# Patient Record
Sex: FEMALE | Race: WHITE | Employment: PART TIME | URBAN - METROPOLITAN AREA
[De-identification: names, ages, dates, MRNs, and addresses within clinical notes are randomized per-mention and may not be internally consistent; named-entity substitution may affect disease eponyms.]

---

## 2018-12-03 ENCOUNTER — APPOINTMENT (OUTPATIENT)
Dept: PHYSICAL THERAPY | Age: 48
End: 2018-12-03

## 2018-12-11 ENCOUNTER — HOSPITAL ENCOUNTER (OUTPATIENT)
Dept: PHYSICAL THERAPY | Age: 48
Discharge: HOME | End: 2018-12-11
Payer: COMMERCIAL

## 2018-12-11 PROCEDURE — 97162 PT EVAL MOD COMPLEX 30 MIN: CPT

## 2018-12-11 PROCEDURE — 97110 THERAPEUTIC EXERCISES: CPT

## 2018-12-11 PROCEDURE — 97140 MANUAL THERAPY 1/> REGIONS: CPT

## 2018-12-11 NOTE — PROGRESS NOTES
Leandra Hale 29  [facility] CLINIC [facility]  [facility] 
[facility], [address] [place] [address], [address]  Phone: [phone]  Fax: [phone] PLAN OF CARE / STATEMENT OF MEDICAL NECESSITY FOR PHYSICAL THERAPY SERVICES Patient Name: Nancy Banks : 1970 Medical  
Diagnosis: Right knee pain [M25.561] Right ankle pain [M25.571] Treatment Diagnosis: Right knee pain [M25.561] Onset Date: 1 month Referral Source: Cherry Bryant MD [facility]): 2018 Prior Hospitalization: See medical history Provider #: [id number] Prior Level of Function: No limitations to activities Comorbidities: Concurrent R ankle pain Medications: Verified on Patient Summary List  
The Plan of Care and following information is based on the information from the initial evaluation.  
=========================================================================================== Assessment / key information:  Pt is a 46y.o. year old female with subjective complaints of insidious onset of R knee pain. Pt is limited with ability to squat, do stairs, and walk dog. Pt denies red flags. Notes increased swelling to anterior, inferior R knee. Current pain is rated as 1 to 4/10; localized to cong/post-lateral knee. FOTO score= 51/100 indicating 49% impairment to functional activities. Today's evaulation is significant for  
1) postural impairments: Patellar malalignment: Right: Grade 2 lateral patellar tilt, 4 cm medial glide. Left: grade 3 lateral patellar tilt . 2) Impaired knee A/PROM: R 0 to 110/120* 3) Impaired strength:  Hip flex R 4-/5, L 4/5; Knee Ext R 4+*/5, L 4+/5; flex R 4*/5, L 5/5. Poor b/l VMO recruitment in QS. 4) Additional findings: R knee edema 2 cm. TTP to distal biceps femoris, lateral patellar retinaculum. Squat limited to 45 deg due to pain with dynamic genu valgus. (-) ligamentous tests. These findings are supportive for diagnosis of R knee patello-femoral syndrome. Pt will be a good candidate for skilled PT to address these impairments and promote return to normal ADLs and functional mobility for improved quality of life. 
=========================================================================================== Eval Complexity: History MEDIUM  Complexity : 1-2 comorbidities / personal factors will impact the outcome/ POC ;  Examination  MEDIUM Complexity : 3 Standardized tests and measures addressing body structure, function, activity limitation and / or participation in recreation ; Presentation MEDIUM Complexity : Evolving with changing characteristics ; Decision Making MEDIUM Complexity : FOTO score of 26-74; Overall Complexity MEDIUM Problem List: pain affecting function, decrease ROM, decrease strength, edema affecting function, impaired gait/ balance, decrease ADL/ functional abilitiies, decrease activity tolerance and decrease flexibility/ joint mobility Treatment Plan may include any combination of the following: Therapeutic exercise, Therapeutic activities, Neuromuscular re-education, Physical agent/modality, Gait/balance training, Manual therapy, Patient education and Functional mobility training Patient / Family readiness to learn indicated by: asking questions, trying to perform skills and interest 
Persons(s) to be included in education: patient (P) Barriers to Learning/Limitations: None Measures taken:   
Patient Goal (s): \"less pain increased mobility\" Patient self reported health status: excellent Rehabilitation Potential: good ? Short Term Goals: To be accomplished in  2  weeks: 1. Pt will be educated in appropriate HEP to decrease pain, increase ROM, increase strength and return pt to PLOF. 2. Pt will demonstrate good patellar tracking with QS. 3.  Pt will increase R knee flexion by >/= 10 degrees to promote decreased pain with bending knee. ? Long Term Goals: To be accomplished in  6  weeks: 1. Pt will improve FOTO score to >/= 69 to demo a significant improvement in functional activity tolerance. 2. Pt will demonstrate squat to 90 degrees with good mechanics, pain free to decrease impairment caring for dog. 3. Pt will ambulate reciprocally up/down flight of stairs without increased knee pain with good mechanics. Frequency / Duration:   Patient to be seen  2  times per week for 4-6  weeks: 
Patient / Caregiver education and instruction: activity modification and exercises G-Codes (GP): n/a Therapist Signature: Aureliano Javier PT Date: 12/11/2018 Certification Period: n/a Time: 10:56 AM  
=========================================================================================== I certify that the above Physical Therapy Services are being furnished while the patient is under my care. I agree with the treatment plan and certify that this therapy is necessary. Physician Signature:       Date:      Time:  Please sign and return to [facility] or you may fax the signed copy to [phone]. Thank you.

## 2018-12-13 ENCOUNTER — HOSPITAL ENCOUNTER (OUTPATIENT)
Dept: PHYSICAL THERAPY | Age: 48
Discharge: HOME | End: 2018-12-13
Payer: COMMERCIAL

## 2018-12-13 PROCEDURE — 97140 MANUAL THERAPY 1/> REGIONS: CPT | Performed by: PHYSICAL THERAPIST

## 2018-12-13 PROCEDURE — 97110 THERAPEUTIC EXERCISES: CPT | Performed by: PHYSICAL THERAPIST

## 2018-12-13 NOTE — PROGRESS NOTES
PHYSICAL THERAPY - DAILY TREATMENT NOTE    Patient Name: Faizan Segundo        Date: 2018  : 1970   YES Patient  Verified  Visit #:   2   of     Insurance: Payor: BLUE CROSS / Plan: [facility] PPO / Product Type: PPO /      In time: 10:40 Out time: 11:45   Total Treatment Time: 55     Medicare Time Tracking (below)   Total Timed Codes (min):  na 1:1 Treatment Time:  na     TREATMENT AREA = Right knee pain [M25.561]  Right ankle pain [M25.571]    SUBJECTIVE  Pain Level (on 0 to 10 scale):  2-3  / 10   Medication Changes/New allergies or changes in medical history, any new surgeries or procedures? NO    If yes, update Summary List   Subjective Functional Status/Changes:  []  No changes reported     Pt reports knee pain had been focal to posterolateral side of knee. She wore the tape for 2 days, and did not see any significant relief of symptoms.           OBJECTIVE  Modalities Rationale:     decrease edema, decrease inflammation and decrease pain to improve patient's ability to increase activity tolerance   min [] Estim, type/location:                                      []  att     []  unatt     []  w/US     []  w/ice    []  w/heat    min []  Mechanical Traction: type/lbs                   []  pro   []  sup   []  int   []  cont    []  before manual    []  after manual    min []  Ultrasound, settings/location:      min []  Iontophoresis w/ dexamethasone, location:                                               []  take home patch       []  in clinic   10 min [x]  Ice     []  Heat    location/position: R Knee - supine after treatment    min []  Vasopneumatic Device, press/temp:     min []  Other:    [] Skin assessment post-treatment (if applicable):    []  intact    []  redness- no adverse reaction     []redness  adverse reaction:        40(25) min Therapeutic Exercise:  [x]  See flow sheet   Rationale:      increase ROM, increase strength and improve coordination to improve the patients ability to regain activity tolerance     5 min Manual Therapy: STM to lateral bicep femoris muscle   Rationale:      decrease pain, increase ROM and increase tissue extensibility to improve patient's ability to decrease symptoms     min Therapeutic Activity:         min Neuromuscular Re-ed:         min Gait Training:       min Patient Education:  YES  Reviewed HEP   []  Progressed/Changed HEP based on: Other Objective/Functional Measures:    Pt has increased muscle tension and tenderness at distal/lateral hamstring. Non tender at patella and no symptoms with mobilization     Post Treatment Pain Level (on 0 to 10) scale:   2  / 10     ASSESSMENT  Assessment/Changes in Function:     Pt encouraged to perform hamstring stretch with hip IR to isolate the lateral hamstrings. []  See Progress Note/Recertification   Patient will continue to benefit from skilled PT services to modify and progress therapeutic interventions, address functional mobility deficits, address ROM deficits, address strength deficits, analyze and address soft tissue restrictions, analyze and cue movement patterns, analyze and modify body mechanics/ergonomics and assess and modify postural abnormalities to attain remaining goals. Progress toward goals / Updated goals:     Will progress as tolerated     PLAN  [x]  Upgrade activities as tolerated YES Continue plan of care   []  Discharge due to :    []  Other:      Therapist: Rahul Blanco PT    Date: 12/13/2018 Time: 10:00 AM     Future Appointments   Date Time Provider Joseph Paez   12/13/2018 10:30 AM Nash Carrion PT [facility]   12/17/2018  4:00 PM Pearl Mckeon [facility]   12/19/2018  4:00 PM Brady Stiles [facility]   1/2/2019  4:30 PM Nash Carrion PT [facility]   1/3/2019 10:00 AM [facility]   1/8/2019 10:00 AM [facility] PT [place] [address] [facility]   1/10/2019 10:00 AM [facility] PT [place] [address] [place] [address]

## 2018-12-17 ENCOUNTER — APPOINTMENT (OUTPATIENT)
Dept: PHYSICAL THERAPY | Age: 48
End: 2018-12-17
Payer: COMMERCIAL

## 2019-01-02 ENCOUNTER — HOSPITAL ENCOUNTER (OUTPATIENT)
Dept: PHYSICAL THERAPY | Age: 49
Discharge: HOME | End: 2019-01-02
Payer: COMMERCIAL

## 2019-01-02 PROCEDURE — 97110 THERAPEUTIC EXERCISES: CPT | Performed by: PHYSICAL THERAPIST

## 2019-01-03 ENCOUNTER — HOSPITAL ENCOUNTER (OUTPATIENT)
Dept: PHYSICAL THERAPY | Age: 49
Discharge: HOME | End: 2019-01-03
Payer: COMMERCIAL

## 2019-01-03 PROCEDURE — 97110 THERAPEUTIC EXERCISES: CPT

## 2019-01-03 PROCEDURE — 97140 MANUAL THERAPY 1/> REGIONS: CPT

## 2019-01-03 NOTE — PROGRESS NOTES
Leandra Hale 29  [facility]  [facility] 
[facility], [address] ,[address], [address]  Phone: [phone]  Fax: [phone] PROGRESS NOTE Patient Name: Melissa Garvey : 1970 Treatment/Medical Diagnosis: Right knee pain [M25.561] Right ankle pain [M25.571] Referral Source: Christal Bryant MD    
Date of Initial Visit: 2018 Attended Visits: 4 Missed Visits: 2 SUMMARY OF TREATMENT Pt with diagnosis of Right knee pain has attended 4 sessions of PT over the past 3 weeks. Physical therapy has consisted of therapeutic exercises for increased knee/hip and core strength, ROM, and flexibility. Manual therapy for improved patellar alignment, reduced tone to lateral biceps femoris/ITB and Meyer taping for correction of medial glide and lateral tilt as well as 1 session of MET's for SI joint alignment. Ice provided PRN for palliative. Pt education provided regarding HEP to address impairments. CURRENT STATUS Overall, pt has made fair progress achieving 3/4 STG's. Pt rates overall improvement as 75% with functional activities since [facility]. Current improvements: Able to descend stairs pain-free. Decreased swelling. Increased A/PROM. Able to walk with dog 1 hr. Pain free. Current objective impairments:  AROM/PROM: Right knee flexion 124/135*. L 135/140. Average/least pain 0/10. Max pain 3/10 (with squatting activity) MMT:  Hip flex R 4*/5; Knee Ext R 5*/5; flex R 4+*/5. QS with improved VMO recruitment (Fair+) and improved tracking noted. FOTO 62 (+11 point change from [facility]) Current functional limitations: Pt reports, \"I'm afraid to put pressure on my knees (i.e. Kneeling) and have pain with squatting\" Goal/Measure of Progress Goal Met? 1. Pt will be educated in appropriate HEP to decrease pain, increase ROM, increase strength and return pt to PLOF.    
Status at last Eval:  Current Status: yes yes 2.  Pt will demonstrate good patellar tracking with QS. Status at last Eval: Poor Current Status: Fair+ progressing 3. Pt will increase R knee flexion by >/= 10 degrees to promote decreased pain with bending knee. Status at last Eval: 110/120* Current Status: 124/135* yes 4. Pt will ambulate reciprocally up/down flight of stairs without increased knee pain with good mechanics. Status at last Eval: pain Current Status: Pain free ascend/descend yes New Goals to be achieved in __4__  weeks: 
1.  1. Pt will improve FOTO score to >/= 69 to demo a significant improvement in functional activity tolerance. 2.  Pt will demonstrate squat to 90 degrees with good mechanics, pain free to decrease impairment caring for dog. 3.  Pt will demonstrate good patellar tracking with QS. RECOMMENDATIONS Recommend skilled progression of PT 2 x/wk for additional 4 weeks to reach LTG's listed above. If you have any questions/comments please contact us directly at [phone]. Thank you for allowing us to assist in the care of your patient. Therapist Signature: Bryan Portillo. HYACINTH Javier Date: 1/3/2019 Time: 10:27 AM  
NOTE TO PHYSICIAN:  PLEASE COMPLETE THE ORDERS BELOW AND FAX TO [facility]: [phone]. If you are unable to process this request in 24 hours please contact our office: [phone]. 
 
___ I have read the above report and request that my patient continue as recommended.  
___ I have read the above report and request that my patient continue therapy with the following changes/special instructions:_________________________________________________________  
___ I have read the above report and request that my patient be discharged from therapy.   
 
Physician Signature:       Date:      Time:

## 2019-01-03 NOTE — PROGRESS NOTES
PHYSICAL THERAPY - DAILY TREATMENT NOTE Patient Name: Richard Yeager        Date: 1/3/2019 : 1970   yes Patient  Verified Visit #:   4     Insurance: Payor: BLUE CROSS / Plan: [facility] PPO / Product Type: PPO / In time: 10:02 Out time: 11:10 AM  
Total Treatment Time: 76 Medicare/BCBS Time Tracking (below) Total Timed Codes (min):  53 1:1 Treatment Time:  48 TREATMENT AREA =  Right knee pain [M25.561] Right ankle pain [M25.571] SUBJECTIVE Pain Level (on 0 to 10 scale):  0  / 10 Medication Changes/New allergies or changes in medical history, any new surgeries or procedures?    no  If yes, update Summary List  
Subjective Functional Status/Changes:  []  No changes reported Pt states tape is helping with pain during squats. States she does not have knee pain with any other activities at home other than squatting. OBJECTIVE Modalities Rationale:    decrease edema, decrease inflammation and decrease pain to improve patient's ability to increase activity tolerance 
 min [] Estim, type/location:   
                                 []  att     []  unatt     []  w/US     []  w/ice    []  w/heat 
 min []  Mechanical Traction: type/lbs   
               []  pro   []  sup   []  int   []  cont    []  before manual    []  after manual  
 min []  Ultrasound, settings/location:    
 min []  Iontophoresis w/ dexamethasone, location:   
                                           []  take home patch       []  in clinic  
10 min [x]  Ice     []  Heat    location/position: R Knee - supine after treatment  
 min []  Vasopneumatic Device, press/temp:   
 min []  Other:   
[] Skin assessment post-treatment (if applicable):   
[]  intact    []  redness- no adverse reaction    
[]redness  adverse reaction:     
43 min Therapeutic Exercise:  [x]  See flow sheet Rationale:     increase ROM, increase strength and improve coordination to improve the patients ability to regain activity tolerance 10 min Manual Therapy: STM to R biceps femoris distal, proximal gastroc/anconeus. Lateral patellar release. METs for pubic clearing, R ant innominate Rationale:      decrease pain, increase ROM and increase tissue extensibility to improve patient's ability to improve pain free walking/squatting Billed With/As: 
 [] TE 
 [] TA 
 [] Neuro 
 [] Self Care Patient Education: [x] Review HEP [] Progressed/Changed HEP based on:  
[] positioning   [] body mechanics   [] transfers   [] heat/ice application   
[] other:   
Other Objective/Functional Measures: 
 
See PN 
-added glute prep, lateral band walks, SAQ with cues for VMO activation 
-increased reps/resistance as per FS Post Treatment Pain Level (on 0 to 10) scale:   1-2  / 10 ASSESSMENT Assessment/Changes in Function:  
 
See PN 
  
[]  See Progress Note/Recertification Patient will continue to benefit from skilled PT services to modify and progress therapeutic interventions, address ROM deficits, address strength deficits, analyze and address soft tissue restrictions, analyze and cue movement patterns and analyze and modify body mechanics/ergonomics to attain remaining goals. Progress toward goals / Updated goals: 
See PN  
 
PLAN 
[]  Upgrade activities as tolerated yes Continue plan of care  
[]  Discharge due to :   
[]  Other:   
 
Therapist: Sherie Mazariegos. Yaya PT Date: 1/3/2019 Time: 9:41 AM  
 
Future Appointments Date Time Provider Joseph Paez 1/3/2019 10:00 AM [facility] PT [address]  
1/8/2019 10:00 AM [facility] PT [address]  
1/10/2019 10:00 AM [facility] PT [address]

## 2019-01-08 ENCOUNTER — APPOINTMENT (OUTPATIENT)
Dept: PHYSICAL THERAPY | Age: 49
End: 2019-01-08
Payer: COMMERCIAL

## 2019-01-09 ENCOUNTER — HOSPITAL ENCOUNTER (OUTPATIENT)
Dept: PHYSICAL THERAPY | Age: 49
End: 2019-01-09
Payer: COMMERCIAL

## 2019-01-10 ENCOUNTER — HOSPITAL ENCOUNTER (OUTPATIENT)
Dept: PHYSICAL THERAPY | Age: 49
Discharge: HOME | End: 2019-01-10
Payer: COMMERCIAL

## 2019-01-10 PROCEDURE — 97110 THERAPEUTIC EXERCISES: CPT

## 2019-01-10 PROCEDURE — 97140 MANUAL THERAPY 1/> REGIONS: CPT

## 2019-01-10 NOTE — PROGRESS NOTES
PHYSICAL THERAPY - DAILY TREATMENT NOTE Patient Name: Phuong Sloan        Date: 1/10/2019 : 1970   yes Patient  Verified Visit #:   5     Insurance: Payor: BLUE CROSS / Plan: [facility] PPO / Product Type: PPO / In time: 10:05 Out time: 11:15 Total Treatment Time: 70 Medicare/[facility] Time Tracking (below) Total Timed Codes (min):  60 1:1 Treatment Time:  40 TREATMENT AREA =  Right knee pain [M25.561] Right ankle pain [M25.571] SUBJECTIVE Pain Level (on 0 to 10 scale):  0-1  / 10 Medication Changes/New allergies or changes in medical history, any new surgeries or procedures?    no  If yes, update Summary List  
Subjective Functional Status/Changes:  []  No changes reported Pt c/o ongoing pain with squatting activity. Karen Monreall off last Friday due to itching. OBJECTIVE Modalities Rationale:     decrease pain to improve patient's ability to ambulate stairs/squat  
 min [] Estim, type/location:   
                                 []  att     []  unatt     []  w/US     []  w/ice    []  w/heat 
 min []  Mechanical Traction: type/lbs   
               []  pro   []  sup   []  int   []  cont    []  before manual    []  after manual  
 min []  Ultrasound, settings/location:    
 min []  Iontophoresis w/ dexamethasone, location:   
                                           []  take home patch       []  in clinic  
10 min [x]  Ice     []  Heat    location/position:   
 min []  Vasopneumatic Device, press/temp:   
 min []  Other:   
[] Skin assessment post-treatment (if applicable):   
[]  intact    []  redness- no adverse reaction    
[]redness  adverse reaction:     
30 
(50) min Therapeutic Exercise:  [x]  See flow sheet Rationale:      increase ROM, increase strength and improve coordination to improve the patients ability to regain activity tolerance  
 
10 min Manual Therapy: STM to R biceps femoris distal, proximal gastroc/anconeus, R piriformis. Lateral patellar release. METs for pubic clearing, R ant innominate Rationale:     decrease pain, increase ROM and increase tissue extensibility to improve patient's ability to improve pain free walking/squatting Billed With/As: 
 [x] TE 
 [] TA 
 [] Neuro 
 [] Self Care Patient Education: [x] Review HEP [x] Progressed/Changed HEP based on: (see chart copy) [] positioning   [] body mechanics   [] transfers   [] heat/ice application   
[] other:   
Other Objective/Functional Measures: 
 
SI joint check:  R ant innominate 
 
-increased reps with clams/bridges 
-added 90-90 toe tap Post Treatment Pain Level (on 0 to 10) scale:   0-1  / 10 ASSESSMENT Assessment/Changes in Function:  
 
Pt reports abolished posterior knee pain after manual treatment. Moderate cues for form with Semi-squats and band walks/glute prep. Glute fatigue noted by pt after standing exercises. Plan for skilled progression to improve core stability and hip/knee mechanics for improved tolerance to squat. []  See Progress Note/Recertification Patient will continue to benefit from skilled PT services to modify and progress therapeutic interventions, address functional mobility deficits, address ROM deficits, address strength deficits, analyze and address soft tissue restrictions, analyze and cue movement patterns, analyze and modify body mechanics/ergonomics and assess and modify postural abnormalities to attain remaining goals. Progress toward goals / Updated goals: No objective changes from PN completed last session PLAN 
[]  Upgrade activities as tolerated yes Continue plan of care  
[]  Discharge due to :   
[]  Other:   
 
Therapist: Nida Dancer. Yaya PT Date: 1/10/2019 Time: 10:37 AM  
 
No future appointments.

## 2019-01-15 ENCOUNTER — HOSPITAL ENCOUNTER (OUTPATIENT)
Dept: PHYSICAL THERAPY | Age: 49
Discharge: HOME | End: 2019-01-15
Payer: COMMERCIAL

## 2019-01-15 PROCEDURE — 97140 MANUAL THERAPY 1/> REGIONS: CPT

## 2019-01-15 PROCEDURE — 97110 THERAPEUTIC EXERCISES: CPT

## 2019-01-15 NOTE — PROGRESS NOTES
PHYSICAL THERAPY - DAILY TREATMENT NOTE Patient Name: Viki Loo        Date: 1/15/2019 : 1970   yes Patient  Verified Visit #:   6     Insurance: Payor: BLUE CROSS / Plan: [facility] PPO / Product Type: PPO / In time: 1:04 Out time: 2:04 Total Treatment Time: 60 Medicare/[facility] Time Tracking (below) Total Timed Codes (min):  46 1:1 Treatment Time:  32 TREATMENT AREA =  Right knee pain [M25.561] Right ankle pain [M25.571] SUBJECTIVE Pain Level (on 0 to 10 scale):  0  / 10 Medication Changes/New allergies or changes in medical history, any new surgeries or procedures?    no  If yes, update Summary List  
Subjective Functional Status/Changes:  []  No changes reported Pt states no popping to the knee for 3-4 days. Reports no pain to knee since last session; though admits has been avoiding squats. \"I was on the elliptical for 30 minutes the other day without pain which is a lot better than it was\". OBJECTIVE Modalities Rationale:    decrease pain to improve patient's ability to ambulate stairs/squat  
 min [] Estim, type/location:   
                                 []  att     []  unatt     []  w/US     []  w/ice    []  w/heat 
 min []  Mechanical Traction: type/lbs   
               []  pro   []  sup   []  int   []  cont    []  before manual    []  after manual  
 min []  Ultrasound, settings/location:    
 min []  Iontophoresis w/ dexamethasone, location:   
                                           []  take home patch       []  in clinic  
10 min [x]  Ice     []  Heat    location/position: R knee, supine with wedge  
 min []  Vasopneumatic Device, press/temp:   
 min []  Other:   
[] Skin assessment post-treatment (if applicable):   
[]  intact    []  redness- no adverse reaction    
[]redness  adverse reaction:     
18 
(38) min Therapeutic Exercise:  [x]  See flow sheet Rationale:      increase ROM, increase strength and improve coordination to improve the patients ability to regain activity tolerance  
 
 
8 min Manual Therapy:  Lateral patellar release with pt education for self mob including use of video recording for reference during HEP. Rationale:      decrease pain, increase ROM and increase tissue extensibility to improve patient's ability to improve pain free walking/squatting Billed With/As: 
 [x] TE 
 [] TA 
 [] Neuro 
 [] Self Care Patient Education: [x] Review HEP [] Progressed/Changed HEP based on:  
[] positioning   [] body mechanics   [] transfers   [] heat/ice application   
[] other:   
Other Objective/Functional Measures: 
SI joint check: level 
 
-reports \"click\" to R knee with bike. Post Treatment Pain Level (on 0 to 10) scale:   0  / 10 ASSESSMENT Assessment/Changes in Function:  
 
R patellar lateral tilt~ 30 degrees, grade II; pt ed on self mob today. Pt demonstrates improving hip/knee mechanics with functional squat with no c/o pain/clicking. []  See Progress Note/Recertification Patient will continue to benefit from skilled PT services to modify and progress therapeutic interventions, address ROM deficits, address strength deficits, analyze and address soft tissue restrictions, analyze and cue movement patterns and analyze and modify body mechanics/ergonomics to attain remaining goals. Progress toward goals / Updated goals: LTG #2 in progress; squat to ~45 degrees pain free PLAN 
[]  Upgrade activities as tolerated yes Continue plan of care  
[]  Discharge due to :   
[]  Other:   
 
Therapist: Negrita Javier PT Date: 1/15/2019 Time: 1:04 PM  
 
Future Appointments Date Time Provider Joseph Paez 1/17/2019  1:30 PM Ernestina Mclaughlin PT [facility] [place]  
1/22/2019  2:30 PM Ernestina Mclaughlin PT [facility] [place]  
1/24/2019  2:00 PM Ernestina Mclaughlin PT [facility] [place]  
1/29/2019  2:00 PM [facility] PT [address]  
1/31/2019  2:00 PM [facility] PT [address]

## 2019-01-17 ENCOUNTER — HOSPITAL ENCOUNTER (OUTPATIENT)
Dept: PHYSICAL THERAPY | Age: 49
Discharge: HOME | End: 2019-01-17
Payer: COMMERCIAL

## 2019-01-17 PROCEDURE — 97110 THERAPEUTIC EXERCISES: CPT | Performed by: PHYSICAL THERAPIST

## 2019-01-17 NOTE — PROGRESS NOTES
PHYSICAL THERAPY - DAILY TREATMENT NOTE Patient Name: Lorraine Monsalve        Date: 2019 : 1970   YES Patient  Verified Visit #:     Insurance: Payor: BLUE CROSS / Plan: [facility] PPO / Product Type: PPO / In time: 2:30 Out time: 3:25 Total Treatment Time: 55  
 
BCBS/Medicare Time Tracking (below) Total Timed Codes (min):  30 1:1 Treatment Time:  30 TREATMENT AREA =  Right knee pain [M25.561] Right ankle pain [M25.571] SUBJECTIVE Pain Level (on 0 to 10 scale):  0  / 10 Medication Changes/New allergies or changes in medical history, any new surgeries or procedures? NO    If yes, update Summary List  
Subjective Functional Status/Changes:  []  No changes reported Pt reports doing a deep squat earlier today without thinking about it and di not have any knee symptoms. Modalities Rationale:     decrease edema, decrease inflammation and decrease pain to improve patient's ability to prevent soreness 
 min [] Estim, type/location:   
                                 []  att     []  unatt     []  w/US     []  w/ice    []  w/heat 
 min []  Mechanical Traction: type/lbs   
               []  pro   []  sup   []  int   []  cont    []  before manual    []  after manual  
 min []  Ultrasound, settings/location:    
 min []  Iontophoresis w/ dexamethasone, location:   
                                           []  take home patch       []  in clinic  
10 min [x]  Ice     []  Heat    location/position: R knee - supine after treatment  
 min []  Vasopneumatic Device, press/temp:   
 min []  Other:   
[] Skin assessment post-treatment (if applicable):   
[]  intact    []  redness- no adverse reaction    
[]redness  adverse reaction:     
45(30) min Therapeutic Exercise:  [x]  See flow sheet Rationale:      increase ROM, increase strength, improve coordination and increase proprioception to improve the patients ability to increase activity tolerance Billed With/As: 
 [] TE 
 [] TA 
 [] Neuro 
 [] Self Care Patient Education: [x] Review HEP [] Progressed/Changed HEP based on:  
[] positioning   [] body mechanics   [] transfers   [] heat/ice application   
[] other:   
Other Objective/Functional Measures: Added deep squat on [employer] today, level 6, and able to achieve 115 degrees of right knee flexion with squat Post Treatment Pain Level (on 0 to 10) scale:   0  / 10 ASSESSMENT Assessment/Changes in Function:  
 
Had minimal pain with [employer] squats, but required VCs to avoid hip adduction during deeper squats 
  
[]  See Progress Note/Recertification Patient will continue to benefit from skilled PT services to modify and progress therapeutic interventions, address functional mobility deficits, address ROM deficits, address strength deficits, analyze and address soft tissue restrictions, analyze and cue movement patterns, analyze and modify body mechanics/ergonomics and assess and modify postural abnormalities to attain remaining goals. Progress toward goals / Updated goals: 
Progressing slowly with regaining functional squat depth without symptoms. PLAN 
[]  Upgrade activities as tolerated YES Continue plan of care  
[]  Discharge due to :   
[]  Other:   
 
Therapist: Josy Hinojosa, PT Date: 1/17/2019 Time: 9:50 AM  
 
Future Appointments Date Time Provider Joseph Paez 1/17/2019  1:30 PM Elkin Kaufman, PT [facility]  
1/22/2019  2:30 PM Elkin Kaufman PT [facility]  
1/24/2019  2:00 PM Elkin Kaufman PT [facility]  
1/29/2019  2:00 PM [facility] PT [address]  
1/31/2019  2:00 PM [facility] [address] [place] [address]

## 2019-01-24 ENCOUNTER — HOSPITAL ENCOUNTER (OUTPATIENT)
Dept: PHYSICAL THERAPY | Age: 49
Discharge: HOME | End: 2019-01-24
Payer: COMMERCIAL

## 2019-01-24 PROCEDURE — 97110 THERAPEUTIC EXERCISES: CPT | Performed by: PHYSICAL THERAPIST

## 2019-01-24 NOTE — PROGRESS NOTES
PHYSICAL THERAPY - DAILY TREATMENT NOTE Patient Name: Sheri Freedrick        Date: 2019 : 1970   YES Patient  Verified Visit #:     Insurance: Payor: BLUE CROSS / Plan: [facility] PPO / Product Type: PPO / In time: 2:30 Out time: 3:25 Total Treatment Time: 55  
 
BCBS/Medicare Time Tracking (below) Total Timed Codes (min):  10 1:1 Treatment Time:  10 TREATMENT AREA =  Right knee pain [M25.561] Right ankle pain [M25.571] SUBJECTIVE Pain Level (on 0 to 10 scale):  0  / 10 Medication Changes/New allergies or changes in medical history, any new surgeries or procedures? NO    If yes, update Summary List  
Subjective Functional Status/Changes:  []  No changes reported Pt notes trying to wear orthotic, but they became, uncomfortable after about an hour. She has not tried to wear them today. Modalities Rationale:     decrease edema, decrease inflammation and decrease pain to improve patient's ability to prevent soreness 
 min [] Estim, type/location:   
                                 []  att     []  unatt     []  w/US     []  w/ice    []  w/heat 
 min []  Mechanical Traction: type/lbs   
               []  pro   []  sup   []  int   []  cont    []  before manual    []  after manual  
 min []  Ultrasound, settings/location:    
 min []  Iontophoresis w/ dexamethasone, location:   
                                           []  take home patch       []  in clinic  
10 min [x]  Ice     []  Heat    location/position: R knee - supine after treatment  
 min []  Vasopneumatic Device, press/temp:   
 min []  Other:   
[] Skin assessment post-treatment (if applicable):   
[]  intact    []  redness- no adverse reaction    
[]redness  adverse reaction:     
45(10) min Therapeutic Exercise:  [x]  See flow sheet Rationale:      increase ROM, increase strength, improve coordination and increase proprioception to improve the patients ability to increase activity tolerance Billed With/As: 
 [] TE 
 [] TA 
 [] Neuro 
 [] Self Care Patient Education: [x] Review HEP [] Progressed/Changed HEP based on:  
[] positioning   [] body mechanics   [] transfers   [] heat/ice application   
[] other:   
Other Objective/Functional Measures: 
 
Pt seen to have medial deviation of knee with squatting exercises, and required VCs to correct. Post Treatment Pain Level (on 0 to 10) scale:   0  / 10 ASSESSMENT Assessment/Changes in Function:  
 
Pt encouraged to increase attention to avoid medial knee deviation during squatting activities. []  See Progress Note/Recertification Patient will continue to benefit from skilled PT services to modify and progress therapeutic interventions, address functional mobility deficits, address ROM deficits, address strength deficits, analyze and address soft tissue restrictions, analyze and cue movement patterns, analyze and modify body mechanics/ergonomics and assess and modify postural abnormalities to attain remaining goals. Progress toward goals / Updated goals: Able to perform deeper squats on [employer] without symptoms. PLAN 
[]  Upgrade activities as tolerated YES Continue plan of care  
[]  Discharge due to :   
[]  Other:   
 
Therapist: Kris Alvarado PT Date: 1/24/2019 Time: 9:50 AM  
 
Future Appointments Date Time Provider Joseph Paez 1/24/2019  2:00 PM Allyson Russo, PT [facility] [place]  
1/29/2019  2:00 PM [facility] PT [address]  
1/31/2019  2:00 PM [facility] PT [address]

## 2019-01-29 ENCOUNTER — HOSPITAL ENCOUNTER (OUTPATIENT)
Dept: PHYSICAL THERAPY | Age: 49
Discharge: HOME | End: 2019-01-29
Payer: COMMERCIAL

## 2019-01-29 PROCEDURE — 97110 THERAPEUTIC EXERCISES: CPT

## 2019-01-29 NOTE — PROGRESS NOTES
PHYSICAL THERAPY - DAILY TREATMENT NOTE Patient Name: Yaniv Phillips        Date: 2019 : 1970   yes Patient  Verified Visit #:   10   of   12  Insurance: Payor: BLUE CROSS / Plan: [facility] PPO / Product Type: PPO / In time: 2:11 Out time: 3:03 PM  
Total Treatment Time: 48 Medicare/[facility] Time Tracking (below) Total Timed Codes (min):  38 1:1 Treatment Time:  45 TREATMENT AREA =  Right knee pain [M25.561] Right ankle pain [M25.571] SUBJECTIVE Pain Level (on 0 to 10 scale):  0  / 10 Medication Changes/New allergies or changes in medical history, any new surgeries or procedures?    no  If yes, update Summary List  
Subjective Functional Status/Changes:  []  No changes reported \"I still can't kneel on it and I have intermittent pain that comes out of nowhere sometimes. My knee doesn't pop anymore\". OBJECTIVE Modalities Rationale:    decrease edema, decrease inflammation and decrease pain to improve patient's ability to prevent soreness 
 min [] Estim, type/location:   
                                 []  att     []  unatt     []  w/US     []  w/ice    []  w/heat 
 min []  Mechanical Traction: type/lbs   
               []  pro   []  sup   []  int   []  cont    []  before manual    []  after manual  
 min []  Ultrasound, settings/location:    
 min []  Iontophoresis w/ dexamethasone, location:   
                                           []  take home patch       []  in clinic  
10 min [x]  Ice     []  Heat    location/position: R knee - supine after treatment  
 min []  Vasopneumatic Device, press/temp:   
 min []  Other:   
[] Skin assessment post-treatment (if applicable):   
[]  intact    []  redness- no adverse reaction    
[]redness  adverse reaction:     
38 min Therapeutic Exercise:  [x]  See flow sheet Rationale:     increase ROM, increase strength, improve coordination and increase proprioception to improve the patients ability to increase activity tolerance Billed With/As: 
 [x] TE 
 [] TA 
 [] Neuro 
 [] Self Care Patient Education: [x] Review HEP [] Progressed/Changed HEP based on:  
[] positioning   [] body mechanics   [] transfers   [] heat/ice application   
[x] other:  Reviewed self lateral patellar release technique; had been incorrectly performing medial patellar release. Other Objective/Functional Measures: 
 
-progressed resistance with band walks/glute prep to OTB 
-genu valgum with semi-squats requiring vc's for correction; used mirror for biofeedback 
-cues for 3\" hold with squats in standing and on TG to promote improved neuro-muscular control for form with repetitions. 
-raised TG height to L8; able to squat to 95 deg knee flexion on TG before pain c/o. Post Treatment Pain Level (on 0 to 10) scale:   0  / 10 ASSESSMENT Assessment/Changes in Function: Pt with need for skilled monitoring of mechanics with standing exercises due to tendency for loss of LE stability and dynamic genu valgus with lateral motions/ knee flex/ext. []  See Progress Note/Recertification Patient will continue to benefit from skilled PT services to modify and progress therapeutic interventions, address functional mobility deficits, address strength deficits, analyze and address soft tissue restrictions, analyze and cue movement patterns and analyze and modify body mechanics/ergonomics to attain remaining goals. Progress toward goals / Updated goals: 
Plan formal reassessment NV; likely recommendation to continue 1 x/wk for additional 4 wks for progression of exercise program to achieve LTG's. LTG 2, 3 progressing PLAN 
[]  Upgrade activities as tolerated yes Continue plan of care  
[]  Discharge due to :   
[]  Other:   
 
Therapist: Tammy Razo. Yaya, PT Date: 1/29/2019 Time: 1:26 PM  
 
Future Appointments Date Time Provider Joseph Paez 1/29/2019  2:00 PM [facility] PT [address] [facility] [facility]  
 1/31/2019  2:00 PM [facility] PT [address] [facility]

## 2019-01-31 ENCOUNTER — HOSPITAL ENCOUNTER (OUTPATIENT)
Dept: PHYSICAL THERAPY | Age: 49
Discharge: HOME | End: 2019-01-31
Payer: COMMERCIAL

## 2019-01-31 PROCEDURE — 97110 THERAPEUTIC EXERCISES: CPT

## 2019-01-31 NOTE — PROGRESS NOTES
[address] [facility]  [facility] 
[address], [address], [address]  Phone: [phone]  Fax: [phone] PROGRESS NOTE Patient Name: Alfred Grace : 1970 Treatment/Medical Diagnosis: Right knee pain [M25.561] Right ankle pain [M25.571] Referral Source: Thao Bryant MD    
Date of Initial Visit: 2018 Attended Visits: 11 Missed Visits: 4 SUMMARY OF TREATMENT Pt with diagnosis of Right knee pain has attended 4 sessions of PT over the past 3 weeks. Physical therapy has consisted of therapeutic exercises for increased knee/hip and core strength, ROM, and flexibility. Manual therapy for improved patellar alignment, reduced tone to lateral biceps femoris/ITB and Meyer taping for correction of medial glide and lateral tilt as well as 1 session of MET's for SI joint alignment. Ice provided PRN for palliative. Pt education provided regarding HEP to address impairments. 
  
CURRENT STATUS Overall, pt has made steady progress with initial period of PT. Pt rates overall improvement as 90% with functional activities since [facility]. Current improvements: No pain with stairs. Improved tolerance for squatting activities. Improved walking tolerance up to 2 hours. \"I can bend my knee now, which I couldn't before\" Current objective impairments:   
Average pain 0/10; max pain 6/10- intermittent. R knee flexion A/PROM: 124/129* MMT: Hip flex 4+/5*, Knee ext 5/5, Knee flex 4+/5*. QS good- with minimal lateral tracking Tightness to R piriformis, hamstrings and ITB. Fair kinesthetic awareness for LE mechanics in squatting; requires vc's for proper tracking. FOTO 62 (+11 points from [facility]) Current functional limitations:  In/out from low seat/car, squatting, kneeling. Goal/Measure of Progress Goal Met? 1. Pt will improve FOTO score to >/= 69 to demo a significant improvement in functional activity tolerance. Status at last Eval: 51 Current Status: 62 progressing 2. Pt will demonstrate squat to 90 degrees with good mechanics, pain free to decrease impairment caring for dog. Status at last Eval: Poor mechanics with ant knee pain Current Status: Squat to 74 deg with fair form and ant knee pain, c/o weakness progressing 3. Pt will demonstrate good patellar tracking with QS. Status at last Eval: Poor at [facility] Current Status: Good- progressing New Goals to be achieved in __4__  weeks: 1. Pt will be independent with long term HEP for self management. 2.  Pt will demonstrate squat to 90 degrees with good mechanics, pain free to decrease impairment caring for dog. 3.  Pt will improve FOTO score to >/= 69 to demo a significant improvement in functional activity tolerance. RECOMMENDATIONS Recommend skilled progression of PT at 1-2 x/wk for additional 4 weeks to achieve LTG's and promote improved quality of life. If you have any questions/comments please contact us directly at [phone]. Thank you for allowing us to assist in the care of your patient. Therapist Signature: Ray Javier PT Date: 1/31/2019 Time: 10:55 AM  
NOTE TO PHYSICIAN:  PLEASE COMPLETE THE ORDERS BELOW AND FAX TO [facility]: [phone]. If you are unable to process this request in 24 hours please contact our office: [phone]. 
 
___ I have read the above report and request that my patient continue as recommended.  
___ I have read the above report and request that my patient continue therapy with the following changes/special instructions:_________________________________________________________  
___ I have read the above report and request that my patient be discharged from therapy.   
 
Physician Signature:       Date:      Time:

## 2019-01-31 NOTE — PROGRESS NOTES
PHYSICAL THERAPY - DAILY TREATMENT NOTE Patient Name: Polly Dc        Date: 2019 : 1970   yes Patient  Verified Visit #:      Insurance: Payor: BLUE CROSS / Plan: [facility] PPO / Product Type: PPO / In time: 2:15 Out time: 3:15 Total Treatment Time: 60 Medicare/[facility] Time Tracking (below) Total Timed Codes (min):  46 1:1 Treatment Time:  46 TREATMENT AREA =  Right knee pain [M25.561] Right ankle pain [M25.571] SUBJECTIVE Pain Level (on 0 to 10 scale):  0  / 10 Medication Changes/New allergies or changes in medical history, any new surgeries or procedures?    no  If yes, update Summary List  
Subjective Functional Status/Changes:  []  No changes reported See PN OBJECTIVE Modalities Rationale:     decrease edema, decrease inflammation and decrease pain to improve patient's ability to prevent soreness 
 min [] Estim, type/location:   
                                 []  att     []  unatt     []  w/US     []  w/ice    []  w/heat 
 min []  Mechanical Traction: type/lbs   
               []  pro   []  sup   []  int   []  cont    []  before manual    []  after manual  
 min []  Ultrasound, settings/location:    
 min []  Iontophoresis w/ dexamethasone, location:   
                                           []  take home patch       []  in clinic  
10 min [x]  Ice     []  Heat    location/position: R knee - supine after treatment  
 min []  Vasopneumatic Device, press/temp:   
 min []  Other:   
[] Skin assessment post-treatment (if applicable):   
[]  intact    []  redness- no adverse reaction    
[]redness  adverse reaction:     
46 min Therapeutic Exercise:  [x]  See flow sheet Rationale:    increase ROM, increase strength, improve coordination and increase proprioception to improve the patients ability to increase activity tolerance  
 
 
 
Billed With/As: 
 [x] TE 
 [] TA 
 [] Neuro [] Self Care Patient Education: [x] Review HEP [] Progressed/Changed HEP based on:  
[] positioning   [] body mechanics   [] transfers   [] heat/ice application   
[] other:   
Other Objective/Functional Measures: 
SI check: level pelvis 
 
-progressed bridges to heels on SB; pt notes increased challenge 
-able to DLS on TG L8 to 735 deg before ant knee pain 
-increased reps/resistance as per FS Post Treatment Pain Level (on 0 to 10) scale:   0  / 10 ASSESSMENT Assessment/Changes in Function:  
 
See PN 
  
[]  See Progress Note/Recertification Patient will continue to benefit from skilled PT services to modify and progress therapeutic interventions, address functional mobility deficits, address ROM deficits, address strength deficits, analyze and address soft tissue restrictions, analyze and cue movement patterns and analyze and modify body mechanics/ergonomics to attain remaining goals. Progress toward goals / Updated goals: 
See PN  
 
PLAN 
[]  Upgrade activities as tolerated yes Continue plan of care  
[]  Discharge due to :   
[]  Other:   
 
Therapist: Zelalem Javier PT Date: 1/31/2019 Time: 10:53 AM  
 
Future Appointments Date Time Provider Joseph Paez 1/31/2019  2:00 PM [facility] PT [address]  
2/5/2019  4:00 PM [facility] [place]  
2/12/2019 10:30 AM [facility] PT [address]  
2/19/2019 10:30 AM [facility] PT [address]  
2/26/2019 10:30 AM [facility] PT [address]

## 2019-02-04 ENCOUNTER — HOSPITAL ENCOUNTER (OUTPATIENT)
Dept: PHYSICAL THERAPY | Age: 49
Discharge: HOME | End: 2019-02-04
Payer: COMMERCIAL

## 2019-02-04 PROCEDURE — 97110 THERAPEUTIC EXERCISES: CPT

## 2019-02-04 PROCEDURE — 97140 MANUAL THERAPY 1/> REGIONS: CPT

## 2019-02-04 NOTE — PROGRESS NOTES
PHYSICAL THERAPY - DAILY TREATMENT NOTE Patient Name: Melissa Garvey        Date: 2019 : 1970   yes Patient  Verified Visit #:   15   of   15-19  Insurance: Payor: BLUE CROSS / Plan: [facility] PPO / Product Type: PPO / In time: 3:35 Out time: 4:35 Total Treatment Time: 60 Medicare/[facility] Time Tracking (below) Total Timed Codes (min):  46 1:1 Treatment Time:  45 TREATMENT AREA =  Right knee pain [M25.561] Right ankle pain [M25.571] SUBJECTIVE Pain Level (on 0 to 10 scale):  0  / 10 Medication Changes/New allergies or changes in medical history, any new surgeries or procedures?    no  If yes, update Summary List  
Subjective Functional Status/Changes:  []  No changes reported \"I did my HEP and then 30 min on an elliptical (level 3 or 4), 1.5 hour walk and now my hamstrings feel really, really tight\". Pt did not stretch afterwords. OBJECTIVE Modalities Rationale:   decrease edema, decrease inflammation and decrease pain to improve patient's ability to prevent soreness 
 min [] Estim, type/location:   
                                 []  att     []  unatt     []  w/US     []  w/ice    []  w/heat 
 min []  Mechanical Traction: type/lbs   
               []  pro   []  sup   []  int   []  cont    []  before manual    []  after manual  
 min []  Ultrasound, settings/location:    
 min []  Iontophoresis w/ dexamethasone, location:   
                                           []  take home patch       []  in clinic  
10 min [x]  Ice     []  Heat    location/position: R knee supine- after treatment  
 min []  Vasopneumatic Device, press/temp:   
 min []  Other:   
[] Skin assessment post-treatment (if applicable):   
[]  intact    []  redness- no adverse reaction    
[]redness  adverse reaction:     
30 
(38) min Therapeutic Exercise:  [x]  See flow sheet Rationale:      increase ROM, increase strength, improve coordination and increase proprioception to improve the patients ability to tolerate squats, transfers from floor 8 min Manual Therapy:  METs for pubic clearing, R ant rotated innominate. DTM/TpR to R upper glutes trigger pointw Rationale: decrease pain, increase tissue extensibility and decrease trigger points to tolerate standing/walking activities Billed With/As: 
 [x] TE 
 [] TA 
 [] Neuro 
 [] Self Care Patient Education: [x] Review HEP [] Progressed/Changed HEP based on:  
[] positioning   [] body mechanics   [] transfers   [] heat/ice application   
[x] other: provided handout, demo and pt performs with cues MET for R ant rotated innominate. Other Objective/Functional Measures: 
 
SI joint check: R ant rotated innominate Post Treatment Pain Level (on 0 to 10) scale:   0  / 10 ASSESSMENT Assessment/Changes in Function:  
 
Pt reports hamstring \"tightness/pain\" relieved after manual SI joint correction; level pelvis post correction. Continues to demo dynamic genu valgus L>R LE with squat exercises requiring 1:1 supervision, though improved from last session. []  See Progress Note/Recertification Patient will continue to benefit from skilled PT services to modify and progress therapeutic interventions, address functional mobility deficits, address ROM deficits, address strength deficits, analyze and address soft tissue restrictions, analyze and cue movement patterns, analyze and modify body mechanics/ergonomics and assess and modify postural abnormalities to attain remaining goals. Progress toward goals / Updated goals: 
Slow progress with LTG 2 PLAN 
[]  Upgrade activities as tolerated yes Continue plan of care  
[]  Discharge due to :   
[]  Other:   
 
Therapist: Ken Jones. Yaya PT Date: 2/4/2019 Time: 3:18 PM  
 
Future Appointments Date Time Provider Joseph Paez 2/4/2019  3:30 PM [facility]  
2/5/2019  4:00 PM Yenny Quiroz, PT [facility]  
 2/12/2019 10:30 AM [facility]  
2/19/2019 10:30 AM [facility] PT [place] 1 [facility]  
2/26/2019 10:30 AM [facility] PT [place] 1 [facility]

## 2019-02-05 ENCOUNTER — HOSPITAL ENCOUNTER (OUTPATIENT)
Dept: PHYSICAL THERAPY | Age: 49
Discharge: HOME | End: 2019-02-05
Payer: COMMERCIAL

## 2019-02-05 ENCOUNTER — APPOINTMENT (OUTPATIENT)
Dept: PHYSICAL THERAPY | Age: 49
End: 2019-02-05
Payer: COMMERCIAL

## 2019-02-05 PROCEDURE — 97535 SELF CARE MNGMENT TRAINING: CPT

## 2019-02-05 NOTE — PROGRESS NOTES
PHYSICAL THERAPY - DAILY TREATMENT NOTE Patient Name: Khris Alexander        Date: 2019 : 1970   YES Patient  Verified Visit #:   15   of   19  Insurance: Payor: BLUE CROSS / Plan: [facility] PPO / Product Type: PPO / In time: 4:00 P Out time: 4:20 P Total Treatment Time: 20 [employer]/Medicare Time Tracking (below) Total Timed Codes (min):  20 1:1 Treatment Time:  20 TREATMENT AREA = Right knee pain [M25.561] Right ankle pain [M25.571] SUBJECTIVE Pain Level (on 0 to 10 scale):  0  / 10 Medication Changes/New allergies or changes in medical history, any new surgeries or procedures? NO    If yes, update Summary List  
Subjective Functional Status/Changes:  []  No changes reported Patient reports she is able to wear trial orthotic for almost the entire day without thinking about it. Modalities Rationale:      
 min [] Estim, type/location:   
                                 []  att     []  unatt     []  w/US     []  w/ice    []  w/heat 
 min []  Mechanical Traction: type/lbs   
               []  pro   []  sup   []  int   []  cont    []  before manual    []  after manual  
 min []  Ultrasound, settings/location:    
 min []  Iontophoresis w/ dexamethasone, location:   
                                           []  take home patch       []  in clinic  
 min []  Ice     []  Heat    location/position:   
 min []  Vasopneumatic Device, press/temp:   
 min []  Other:   
[] Skin assessment post-treatment (if applicable):   
[]  intact    []  redness- no adverse reaction    
[]redness  adverse reaction:     
20 min Orthotic fitting:  [x]  See flow sheet Rationale:      improve balance and increase proprioception to improve the patients ability to return to pain-free ambulation Billed With/As: 
 [] TE 
 [] TA 
 [] Neuro [x] Self Care Patient Education: [x] Review HEP [] Progressed/Changed HEP based on: [] positioning   [] body mechanics   [] transfers   [] heat/ice application   
[] other:   
Other Objective/Functional Measures: 
 
Pt trialed both 3/4 green/light density & preferred full length blue/medium density orthotic & was fitted & placed in STN in sneaker & was educated on wear time Post Treatment Pain Level (on 0 to 10) scale:   0  / 10 ASSESSMENT Assessment/Changes in Function:  
 
Good tolerance to today's fitting, pt educated on wear time & also educated on proper shoewear  
  
[]  See Progress Note/Recertification Patient will continue to benefit from skilled PT services to modify and progress therapeutic interventions, address functional mobility deficits, address ROM deficits, address strength deficits, analyze and modify body mechanics/ergonomics, assess and modify postural abnormalities and address imbalance/dizziness to attain remaining goals. Progress toward goals / Updated goals: 
Progressing towards LTG 1 PLAN 
[]  Upgrade activities as tolerated YES Continue plan of care  
[]  Discharge due to :   
[]  Other:   
 
Therapist: Dione Moran PT Date: 2/5/2019 Time: 4:47 PM  
 
Future Appointments Date Time Provider Joseph Paez 2/12/2019 10:30 AM [facility] PT [address]  
2/19/2019 10:30 AM [facility] PT [address]  
2/26/2019 10:30 AM [facility] PT [address]

## 2019-02-12 ENCOUNTER — APPOINTMENT (OUTPATIENT)
Dept: PHYSICAL THERAPY | Age: 49
End: 2019-02-12
Payer: COMMERCIAL

## 2019-02-19 ENCOUNTER — HOSPITAL ENCOUNTER (OUTPATIENT)
Dept: PHYSICAL THERAPY | Age: 49
Discharge: HOME | End: 2019-02-19
Payer: COMMERCIAL

## 2019-02-19 PROCEDURE — 97110 THERAPEUTIC EXERCISES: CPT

## 2019-02-19 NOTE — PROGRESS NOTES
PHYSICAL THERAPY - DAILY TREATMENT NOTE Patient Name: Michael Odell        Date: 2019 : 1970   yes Patient  Verified Visit #:     Insurance: Payor: BLUE CROSS / Plan: [facility] PPO / Product Type: PPO / In time: 10:30 Out time: 11:24 AM  
Total Treatment Time: 47 Medicare/[facility] Time Tracking (below) Total Timed Codes (min):  44 1:1 Treatment Time:  25 TREATMENT AREA =  Right knee pain [M25.561] Right ankle pain [M25.571] SUBJECTIVE Pain Level (on 0 to 10 scale):  0  / 10 Medication Changes/New allergies or changes in medical history, any new surgeries or procedures?    no  If yes, update Summary List  
Subjective Functional Status/Changes:  []  No changes reported \"The orthotics are good. I can wear them all day. \" Pt states she came down with a virus last week and has been unable to do HEP consistently. Occasional sharp pain to lateral knee. Saw ortho last week and offered injection, but pt declined. Pt unable to identify aggravating factor/mechanism for pain reproduction. OBJECTIVE Modalities Rationale:      decrease edema, decrease inflammation and decrease pain to improve patient's ability to prevent soreness 
 min [] Estim, type/location:   
                                 []  att     []  unatt     []  w/US     []  w/ice    []  w/heat 
 min []  Mechanical Traction: type/lbs   
               []  pro   []  sup   []  int   []  cont    []  before manual    []  after manual  
 min []  Ultrasound, settings/location:    
 min []  Iontophoresis w/ dexamethasone, location:   
                                           []  take home patch       []  in clinic  
10 min [x]  Ice     []  Heat    location/position: R knee, supine with wedge  
 min []  Vasopneumatic Device, press/temp:   
 min []  Other:   
[] Skin assessment post-treatment (if applicable):   
[]  intact    []  redness- no adverse reaction    
[]redness  adverse reaction: 25 
(44) min Therapeutic Exercise:  [x]  See flow sheet Rationale:      increase ROM, increase strength, improve coordination and increase proprioception to improve the patients ability to tolerate squats, transfers from floor Billed With/As: 
 [x] TE 
 [] TA 
 [] Neuro 
 [] Self Care Patient Education: [x] Review HEP [] Progressed/Changed HEP based on:  
[] positioning   [] body mechanics   [] transfers   [] heat/ice application   
[] other:   
Other Objective/Functional Measures: 
-cues for increased VMO activation with SLR 
-added knee tband with lateral walk outs 
-progressed to 6\" lateral tap down 
-progressed TG SL squat to L10 Post Treatment Pain Level (on 0 to 10) scale:   0  / 10 ASSESSMENT Assessment/Changes in Function: VMO activation Fair with decreased muscle mass indicating need for progressive strengthening to improve patellar tracking and reduce knee pain. Continues to have increased tightness to biceps femoris and lateral gastroc head. Improved form for semi-squats and band walks indicating improving hip stability/strength. []  See Progress Note/Recertification Patient will continue to benefit from skilled PT services to modify and progress therapeutic interventions, address functional mobility deficits, address ROM deficits, address strength deficits, analyze and address soft tissue restrictions, analyze and cue movement patterns, analyze and modify body mechanics/ergonomics and assess and modify postural abnormalities to attain remaining goals. Progress toward goals / Updated goals: 
Reviewed 2/19; progressing with LTG 1 LTG 2 ~70 deg squat LTG 3 to be assessed NV New Goals to be achieved in __4__  weeks: 1. Pt will be independent with long term HEP for self management. 2.  Pt will demonstrate squat to 90 degrees with good mechanics, pain free to decrease impairment caring for dog.   
3.  Pt will improve FOTO score to >/= 69 to demo a significant improvement in functional activity tolerance.  
  
  
 
 
 
 
PLAN 
[]  Upgrade activities as tolerated yes Continue plan of care  
[]  Discharge due to :   
[x]  Other: Plan d/c NV Therapist: Willy Javier PT Date: 2/19/2019 Time: 8:04 AM  
 
Future Appointments Date Time Provider Joseph Paez 2/19/2019 10:30 AM [facility] PT [address]  
2/26/2019 10:30 AM [facility] PT [address]

## 2019-02-26 ENCOUNTER — HOSPITAL ENCOUNTER (OUTPATIENT)
Dept: PHYSICAL THERAPY | Age: 49
Discharge: HOME | End: 2019-02-26
Payer: COMMERCIAL

## 2019-02-26 PROCEDURE — 97110 THERAPEUTIC EXERCISES: CPT

## 2019-02-26 NOTE — PROGRESS NOTES
Leandra [address]  [facility]  [facility] 
[address] [facility] [address], [address] ,[address], [address]  Phone: [phone]  Fax: [phone] DISCHARGE SUMMARY Patient Name: Sheri Frederick : 1970 Treatment/Medical Diagnosis: Right knee pain [M25.561] Right ankle pain [M25.571] Referral Source: Liza Bryant MD    
Date of Initial Visit: 18 Attended Visits: 15 Missed Visits: 4 SUMMARY OF TREATMENT Pt with diagnosis of Right knee pain has attended 4 sessions of PT over the past 3 weeks. Physical therapy has consisted of therapeutic exercises for increased knee/hip and core strength, ROM, and flexibility. Manual therapy for improved patellar alignment, reduced tone to lateral biceps femoris/ITB and Meyer taping for correction of medial glide and lateral tilt as well as 1 session of MET's for SI joint alignment.   Ice provided PRN for palliative.  Pt education provided regarding HEP to address impairments. CURRENT STATUS Pt has made good progress since [facility] with reduced pain levels, increased knee ROM, increased LE strength. Subjectively, pt notes improved tolerance for stairs, walking, squatting. Pt rates overall improvement in symptoms to 90-95% since [facility]. Pt now demonstrates good QS with normal patellar tracking, however still with grade 1 lateral patellar tilt, likely contributing to ongoing pain and limitation with descending stairs and squats. Squatting:  Standing: to 70 degrees, limited by anterior knee pain. Total Gym L10: DL to 102 degrees; SL to 86 deg. Pt noted to have mild edema to right lower leg with (-) Anthony's today; pt states this has been occurring off/on since onset of knee pain. Average/worst pain over past week 1/10. Goal/Measure of Progress Goal Met? 1. Pt will be independent with long term HEP for self management. Status at last Eval:  Current Status: yes yes 2.  Pt will demonstrate squat to 90 degrees with good mechanics, pain free to decrease impairment caring for dog. Status at last Eval: 45 deg at [facility] Current Status: 70 degrees no 3. Pt will improve FOTO score to >/= 69 to demo a significant improvement in functional activity tolerance. Status at last Eval: 46 [facility]) 62 (PN on 1/31/18) Current Status: 66 progressing RECOMMENDATIONS Discontinue therapy. Progressing towards or have reached established goals. If you have any questions/comments please contact us directly at [phone]. Thank you for allowing us to assist in the care of your patient. Therapist Signature: Meg Javier PT Date: 02/26/19   Time: 8:13 AM

## 2019-02-26 NOTE — PROGRESS NOTES
PHYSICAL THERAPY - DAILY TREATMENT NOTE Patient Name: Khris Alexander        Date: 2019 : 1970   yes Patient  Verified Visit #:   15   of   19  Insurance: Payor: BLUE CROSS / Plan: [facility] PPO / Product Type: PPO / In time: 10:35 Out time: 11:40 Total Treatment Time: 72 Medicare/[facility] Time Tracking (below) Total Timed Codes (min):  51 1:1 Treatment Time:  51 TREATMENT AREA =  Right knee pain [M25.561] Right ankle pain [M25.571] SUBJECTIVE Pain Level (on 0 to 10 scale):  1  / 10 Medication Changes/New allergies or changes in medical history, any new surgeries or procedures?    no  If yes, update Summary List  
Subjective Functional Status/Changes:  []  No changes reported Pt c/o increased soreness/pain to R anterior knee; feels may be related to increase in gym activities over the weekend; was on elliptical.  
 
  
 
OBJECTIVE Modalities Rationale:    decrease edema, decrease inflammation and decrease pain to improve patient's ability to prevent soreness 
 min [] Estim, type/location:   
                                 []  att     []  unatt     []  w/US     []  w/ice    []  w/heat 
 min []  Mechanical Traction: type/lbs   
               []  pro   []  sup   []  int   []  cont    []  before manual    []  after manual  
 min []  Ultrasound, settings/location:    
 min []  Iontophoresis w/ dexamethasone, location:   
                                           []  take home patch       []  in clinic  
10 min [x]  Ice     []  Heat    location/position: R knee, supine with wedge  
 min []  Vasopneumatic Device, press/temp:   
 min []  Other:   
[] Skin assessment post-treatment (if applicable):   
[]  intact    []  redness- no adverse reaction    
[]redness  adverse reaction:     
46 min Therapeutic Exercise:  [x]  See flow sheet Rationale:     increase ROM, increase strength, improve coordination and increase proprioception to improve the patients ability to tolerate squats, transfers from floor 5 min Manual Therapy:  Roblero taping for lateral patellar tilt correction. Pt ed on indications/precautions. Pt allowed to video instructions and application for ability to perform independently PRN. Pt ed on recommendations to remove in 3-5 days or sooner if skin irritation/pain. Pt ed on safe removal of tape. Rationale: decrease pain and correct positional alignment to promote decreased knee pain with squat/sit to stand Billed With/As: 
 [x] TE 
 [] TA 
 [] Neuro 
 [] Self Care Patient Education: [x] Review HEP [] Progressed/Changed HEP based on:  
[] positioning   [] body mechanics   [] transfers   [] heat/ice application   
[] other:   
Other Objective/Functional Measures: 
 
See d/c note 
-increased to 2# SLR Occasional Vc's for squat form 
-squat improves to 75 degrees (+5 degrees) post Roblero taping.  
-issued and ed on finalized HEP Post Treatment Pain Level (on 0 to 10) scale:   0  / 10 ASSESSMENT Assessment/Changes in Function:  
 
See d/c summary Progress toward goals / Updated goals: 
See d/c summary PLAN 
[]  Upgrade activities as tolerated no Continue plan of care [x]  Discharge due to :   
[]  Other:   
 
Therapist: Caterina Callejas. Yaya PT Date: 2/26/2019 Time: 8:09 AM  
 
Future Appointments Date Time Provider Joseph Paez 2/26/2019 10:30 AM [facility]

## 2022-11-06 ENCOUNTER — APPOINTMENT (OUTPATIENT)
Dept: MRI IMAGING | Age: 52
DRG: 947 | End: 2022-11-06
Attending: INTERNAL MEDICINE
Payer: COMMERCIAL

## 2022-11-06 ENCOUNTER — HOSPITAL ENCOUNTER (OUTPATIENT)
Age: 52
Setting detail: OBSERVATION
Discharge: HOME | DRG: 947 | End: 2022-11-07
Attending: INTERNAL MEDICINE | Admitting: INTERNAL MEDICINE
Payer: COMMERCIAL

## 2022-11-06 DIAGNOSIS — F41.9 ANXIETY: Primary | ICD-10-CM

## 2022-11-06 PROBLEM — I63.9 CVA (CEREBRAL VASCULAR ACCIDENT) (HCC): Status: ACTIVE | Noted: 2022-11-06

## 2022-11-06 LAB
GLUCOSE BLD STRIP.AUTO-MCNC: 122 MG/DL (ref 65–117)
SERVICE CMNT-IMP: ABNORMAL

## 2022-11-06 PROCEDURE — G0378 HOSPITAL OBSERVATION PER HR: HCPCS

## 2022-11-06 PROCEDURE — 70551 MRI BRAIN STEM W/O DYE: CPT

## 2022-11-06 PROCEDURE — 82962 GLUCOSE BLOOD TEST: CPT

## 2022-11-06 PROCEDURE — 65270000046 HC RM TELEMETRY

## 2022-11-06 PROCEDURE — 74011250636 HC RX REV CODE- 250/636: Performed by: INTERNAL MEDICINE

## 2022-11-06 PROCEDURE — 74011250637 HC RX REV CODE- 250/637: Performed by: INTERNAL MEDICINE

## 2022-11-06 PROCEDURE — 99223 1ST HOSP IP/OBS HIGH 75: CPT | Performed by: PSYCHIATRY & NEUROLOGY

## 2022-11-06 RX ORDER — SODIUM CHLORIDE 9 MG/ML
75 INJECTION, SOLUTION INTRAVENOUS CONTINUOUS
Status: DISPENSED | OUTPATIENT
Start: 2022-11-06 | End: 2022-11-07

## 2022-11-06 RX ORDER — ACETAMINOPHEN 325 MG/1
650 TABLET ORAL
Status: DISCONTINUED | OUTPATIENT
Start: 2022-11-06 | End: 2022-11-08 | Stop reason: HOSPADM

## 2022-11-06 RX ORDER — POLYETHYLENE GLYCOL 3350 17 G/17G
17 POWDER, FOR SOLUTION ORAL DAILY PRN
Status: DISCONTINUED | OUTPATIENT
Start: 2022-11-06 | End: 2022-11-08 | Stop reason: HOSPADM

## 2022-11-06 RX ORDER — ACETAMINOPHEN 650 MG/1
650 SUPPOSITORY RECTAL
Status: DISCONTINUED | OUTPATIENT
Start: 2022-11-06 | End: 2022-11-08 | Stop reason: HOSPADM

## 2022-11-06 RX ORDER — ONDANSETRON 2 MG/ML
4 INJECTION INTRAMUSCULAR; INTRAVENOUS
Status: DISCONTINUED | OUTPATIENT
Start: 2022-11-06 | End: 2022-11-08 | Stop reason: HOSPADM

## 2022-11-06 RX ORDER — LORAZEPAM 0.5 MG/1
0.5 TABLET ORAL
Status: DISCONTINUED | OUTPATIENT
Start: 2022-11-06 | End: 2022-11-08 | Stop reason: HOSPADM

## 2022-11-06 RX ORDER — GUAIFENESIN 100 MG/5ML
81 LIQUID (ML) ORAL DAILY
Status: DISCONTINUED | OUTPATIENT
Start: 2022-11-07 | End: 2022-11-08 | Stop reason: HOSPADM

## 2022-11-06 RX ADMIN — LORAZEPAM 0.5 MG: 0.5 TABLET ORAL at 19:22

## 2022-11-06 RX ADMIN — SODIUM CHLORIDE 75 ML/HR: 9 INJECTION, SOLUTION INTRAVENOUS at 18:47

## 2022-11-06 NOTE — H&P
[address] [facility] Adult  Hospitalist Group  History and Physical    Date of Service:  11/6/2022  Primary Care Provider: Kranthi Chiu MD  Source of information: The patient, , medical record    Chief Complaint: No chief complaint on file. Confusion, dizziness    History of Presenting Illness:   Francisco Phillips is a 45 y.o. female is known past medical history of a  10 required Xanax as needed, history of bipolar disorder in the past who presents to ED outside of [facility] hospital with complaint of dizziness, confusion  For 2 days. The patient never had similar problem before, she denied having any chest pain, fever, palpitation, loss of conscious or seizure. Per patient  who is physician was noted patient become irritable and irrational in her decision. He called ambulance and patient was brought to emergency department for further evaluation. In the emergency department laboratory data was obtained eventually work-up was negative, CT head without contrast was suspicious for left frontal lobe possible ischemic changes. The patient was transferred to [facility] for admission and further evaluation of possible stroke. The patient never had similar problem before, she is usually considered herself healthy, she able to perform all activities of daily living by herself. She does not take aspirin on a regular basis, and she use Xanax occasionally. Prior to transfer to [facility] patient was premedicated with Ativan, she is feeling better now. The patient took Xanax last time 2 days ago and she denies any symptoms of Xanax withdrawal.  The patient unable to recall many events during these 2 days.     The patient denies any headache, blurry vision, sore throat, trouble swallowing, trouble with speech, chest pain, SOB, cough, fever, chills, N/V/D, abd pain, urinary symptoms, constipation, recent travels, sick contacts, focal or generalized neurological symptoms, falls, injuries, rashes, contact with COVID-19 diagnosed patients, hematemesis, melena, hemoptysis, hematuria, rashes, denies starting any new medications and denies any other concerns or problems besides as mentioned above. REVIEW OF SYSTEMS:  A comprehensive review of systems was negative except for that written in the History of Present Illness. Past Medical History:   Diagnosis Date    Anxiety     Vitamin D deficiency       Past Surgical History:   Procedure Laterality Date    HX HERNIA REPAIR       Prior to Admission medications    Medication Sig Start Date End Date Taking? Authorizing Provider   ergocalciferol (ERGOCALCIFEROL) 50,000 unit capsule Take 50,000 Units by mouth. Yes Provider, Historical   ALPRAZolam (XANAX) 0.5 mg tablet Take  by mouth nightly as needed. Yes Provider, Historical   eszopiclone (LUNESTA) 2 mg tablet Take  by mouth nightly. Patient not taking: Reported on 11/6/2022    Provider, Historical   traMADol-acetaminophen (ULTRACET) 37.5-325 mg per tablet Take  by mouth every four (4) hours as needed. Patient not taking: Reported on 11/6/2022    Provider, Historical     Allergies   Allergen Reactions    Penicillins Unknown (comments)      No family history on file. Social History:  reports that she has never smoked. She has never used smokeless tobacco. She reports current alcohol use. She reports that she does not use drugs. Family and social history were personally reviewed, all pertinent and relevant details are outlined as above. Atrial fibrillation: mother.     Objective:   Visit Vitals  BP (!) 142/92   Pulse 82   Temp 98 °F (36.7 °C)   Resp 16   SpO2 94%           PHYSICAL EXAM:   General: Alert x oriented x 3, awake, no acute distress, resting in bed, pleasant female, appears to be stated age  HEENT: PEERL, EOMI, moist mucus membranes  Neck: Supple, no JVD, no meningeal signs  Chest: Clear to auscultation bilaterally   CVS: RRR, S1 S2 heard, no murmurs/rubs/gallops  Abd: Soft, non-tender, non-distended, +bowel sounds   Ext: No clubbing, no cyanosis, no edema  Neuro/Psych: Pleasant mood and affect, CN 2-12 grossly intact, sensory grossly within normal limit, Strength 5/5 in all extremities, DTR 1+ x 4  Cap refill: Brisk, less than 3 seconds  Pulses: 2+, symmetric in all extremities  Skin: Warm, dry, without rashes or lesions    Data Review: All diagnostic labs and studies have been reviewed. Abnormal Labs Reviewed   GLUCOSE, POC - Abnormal; Notable for the following components:       Result Value    Glucose (POC) 122 (*)     All other components within normal limits       All Micro Results       None            IMAGING:   MRI BRAIN WO CONT    (Results Pending)   CTA HEAD NECK W CONT    (Results Pending)        ECG/ECHO:  No results found for this or any previous visit. Assessment:   Given the patient's current clinical presentation, there is a high level of concern for decompensation if discharged from the emergency department. Complex decision making was performed, which includes reviewing the patient's available past medical records, laboratory results, and imaging studies. Active Problems:    CVA (cerebral vascular accident) ([facility] [address] (11/6/2022)    Dizziness    Acute encephalopathy, with behavioral disturbances    Anxiety, bipolar disorder    Hypertension    Plan:     The patient will be admitted to medicine, telemetry unit is inpatient and will require at least 2 midnight for inpatient treatment. Stroke protocol will be applied. Neurochecks will be done. Neurologist was consulted. CTA of the head and neck was requested, MRI of the brain will be obtained. Echocardiogram will be done. Aspirin will be provided. Ativan will be given as needed for anxiety. Psychiatrist will be consulted for further evaluation of an anxiety and behavioral disturbances.         DIET: ADULT DIET Regular; Low Fat/Low Chol/High Fiber/2 gm Na   ISOLATION PRECAUTIONS: There are currently no Active Isolations  CODE STATUS: Full Code   DVT PROPHYLAXIS: SCDs  FUNCTIONAL STATUS PRIOR TO HOSPITALIZATION: Fully active and ambulatory; able to carry on all self-care without restriction. Ambulatory status/function: By self   EARLY MOBILITY ASSESSMENT: Recommend routine ambulation while hospitalized with the assistance of nursing staff  ANTICIPATED DISCHARGE: 24-48 hours. ANTICIPATED DISPOSITION: Home  EMERGENCY CONTACT/SURROGATE DECISION MAKER:     CRITICAL CARE WAS PERFORMED FOR THIS ENCOUNTER: NO.      Signed By: Fernando Mc MD     November 6, 2022         Please note that this dictation may have been completed with Dragon, the Quewey voice recognition software. Quite often unanticipated grammatical, syntax, homophones, and other interpretive errors are inadvertently transcribed by the computer software. Please disregard these errors. Please excuse any errors that have escaped final proofreading.

## 2022-11-06 NOTE — PROGRESS NOTES
Problem: Falls - Risk of  Goal: *Absence of Falls  Description: Document Ayaan Nickolasherirola Fall Risk and appropriate interventions in the flowsheet.   Outcome: Progressing Towards Goal  Note: Fall Risk Interventions:       Mentation Interventions: Adequate sleep, hydration, pain control, Door open when patient unattended                        Problem: Patient Education: Go to Patient Education Activity  Goal: Patient/Family Education  Outcome: Progressing Towards Goal

## 2022-11-07 ENCOUNTER — APPOINTMENT (OUTPATIENT)
Dept: NON INVASIVE DIAGNOSTICS | Age: 52
DRG: 947 | End: 2022-11-07
Attending: INTERNAL MEDICINE
Payer: COMMERCIAL

## 2022-11-07 ENCOUNTER — APPOINTMENT (OUTPATIENT)
Dept: CT IMAGING | Age: 52
DRG: 947 | End: 2022-11-07
Attending: INTERNAL MEDICINE
Payer: COMMERCIAL

## 2022-11-07 VITALS
TEMPERATURE: 97.9 F | OXYGEN SATURATION: 97 % | HEART RATE: 93 BPM | DIASTOLIC BLOOD PRESSURE: 68 MMHG | SYSTOLIC BLOOD PRESSURE: 136 MMHG | RESPIRATION RATE: 16 BRPM

## 2022-11-07 LAB
ALBUMIN SERPL-MCNC: 3.6 G/DL (ref 3.5–5)
ALBUMIN/GLOB SERPL: 1.2 {RATIO} (ref 1.1–2.2)
ALP SERPL-CCNC: 38 U/L (ref 45–117)
ALT SERPL-CCNC: 28 U/L (ref 12–78)
AMMONIA PLAS-SCNC: 29 UMOL/L
ANION GAP SERPL CALC-SCNC: 8 MMOL/L (ref 5–15)
AST SERPL-CCNC: 19 U/L (ref 15–37)
BILIRUB SERPL-MCNC: 0.7 MG/DL (ref 0.2–1)
BUN SERPL-MCNC: 15 MG/DL (ref 6–20)
BUN/CREAT SERPL: 22 (ref 12–20)
CALCIUM SERPL-MCNC: 9.2 MG/DL (ref 8.5–10.1)
CHLORIDE SERPL-SCNC: 108 MMOL/L (ref 97–108)
CHOLEST SERPL-MCNC: 194 MG/DL
CO2 SERPL-SCNC: 24 MMOL/L (ref 21–32)
CREAT SERPL-MCNC: 0.69 MG/DL (ref 0.55–1.02)
ECHO AV PEAK GRADIENT: 5 MMHG
ECHO AV PEAK VELOCITY: 1.2 M/S
ECHO LA DIAMETER: 2.9 CM
ECHO LV E' LATERAL VELOCITY: 7 CM/S
ECHO LV FRACTIONAL SHORTENING: 40 % (ref 28–44)
ECHO LV INTERNAL DIMENSION DIASTOLIC: 4 CM (ref 3.9–5.3)
ECHO LV INTERNAL DIMENSION SYSTOLIC: 2.4 CM
ECHO LV IVSD: 1 CM (ref 0.6–0.9)
ECHO LV MASS 2D: 145.6 G (ref 67–162)
ECHO LV POSTERIOR WALL DIASTOLIC: 1.2 CM (ref 0.6–0.9)
ECHO LV RELATIVE WALL THICKNESS RATIO: 0.6
ECHO RV TAPSE: 2.7 CM (ref 1.7–?)
ERYTHROCYTE [DISTWIDTH] IN BLOOD BY AUTOMATED COUNT: 11.6 % (ref 11.5–14.5)
EST. AVERAGE GLUCOSE BLD GHB EST-MCNC: 97 MG/DL
GLOBULIN SER CALC-MCNC: 2.9 G/DL (ref 2–4)
GLUCOSE BLD STRIP.AUTO-MCNC: 109 MG/DL (ref 65–117)
GLUCOSE SERPL-MCNC: 96 MG/DL (ref 65–100)
HBA1C MFR BLD: 5 % (ref 4–5.6)
HCT VFR BLD AUTO: 41.9 % (ref 35–47)
HDLC SERPL-MCNC: 51 MG/DL
HDLC SERPL: 3.8 {RATIO} (ref 0–5)
HGB BLD-MCNC: 14.7 G/DL (ref 11.5–16)
LDLC SERPL CALC-MCNC: 123.4 MG/DL (ref 0–100)
MAGNESIUM SERPL-MCNC: 2.3 MG/DL (ref 1.6–2.4)
MCH RBC QN AUTO: 32.5 PG (ref 26–34)
MCHC RBC AUTO-ENTMCNC: 35.1 G/DL (ref 30–36.5)
MCV RBC AUTO: 92.5 FL (ref 80–99)
NRBC # BLD: 0 K/UL (ref 0–0.01)
NRBC BLD-RTO: 0 PER 100 WBC
PLATELET # BLD AUTO: 332 K/UL (ref 150–400)
PMV BLD AUTO: 10.5 FL (ref 8.9–12.9)
POTASSIUM SERPL-SCNC: 3.8 MMOL/L (ref 3.5–5.1)
PROT SERPL-MCNC: 6.5 G/DL (ref 6.4–8.2)
RBC # BLD AUTO: 4.53 M/UL (ref 3.8–5.2)
SERVICE CMNT-IMP: NORMAL
SODIUM SERPL-SCNC: 140 MMOL/L (ref 136–145)
TRIGL SERPL-MCNC: 98 MG/DL (ref ?–150)
VIT B12 SERPL-MCNC: 337 PG/ML (ref 193–986)
VLDLC SERPL CALC-MCNC: 19.6 MG/DL
WBC # BLD AUTO: 7.7 K/UL (ref 3.6–11)

## 2022-11-07 PROCEDURE — 99232 SBSQ HOSP IP/OBS MODERATE 35: CPT | Performed by: PSYCHIATRY & NEUROLOGY

## 2022-11-07 PROCEDURE — 70496 CT ANGIOGRAPHY HEAD: CPT

## 2022-11-07 PROCEDURE — 82140 ASSAY OF AMMONIA: CPT

## 2022-11-07 PROCEDURE — 97161 PT EVAL LOW COMPLEX 20 MIN: CPT

## 2022-11-07 PROCEDURE — 80061 LIPID PANEL: CPT

## 2022-11-07 PROCEDURE — 36415 COLL VENOUS BLD VENIPUNCTURE: CPT

## 2022-11-07 PROCEDURE — 93306 TTE W/DOPPLER COMPLETE: CPT

## 2022-11-07 PROCEDURE — 95816 EEG AWAKE AND DROWSY: CPT | Performed by: PSYCHIATRY & NEUROLOGY

## 2022-11-07 PROCEDURE — 97116 GAIT TRAINING THERAPY: CPT

## 2022-11-07 PROCEDURE — 97530 THERAPEUTIC ACTIVITIES: CPT

## 2022-11-07 PROCEDURE — 4A03X5D MEASUREMENT OF ARTERIAL FLOW, INTRACRANIAL, EXTERNAL APPROACH: ICD-10-PCS | Performed by: INTERNAL MEDICINE

## 2022-11-07 PROCEDURE — G0378 HOSPITAL OBSERVATION PER HR: HCPCS

## 2022-11-07 PROCEDURE — 97165 OT EVAL LOW COMPLEX 30 MIN: CPT

## 2022-11-07 PROCEDURE — 82175 ASSAY OF ARSENIC: CPT

## 2022-11-07 PROCEDURE — 74011250637 HC RX REV CODE- 250/637: Performed by: INTERNAL MEDICINE

## 2022-11-07 PROCEDURE — 82962 GLUCOSE BLOOD TEST: CPT

## 2022-11-07 PROCEDURE — 83735 ASSAY OF MAGNESIUM: CPT

## 2022-11-07 PROCEDURE — 85027 COMPLETE CBC AUTOMATED: CPT

## 2022-11-07 PROCEDURE — 80053 COMPREHEN METABOLIC PANEL: CPT

## 2022-11-07 PROCEDURE — 74011000636 HC RX REV CODE- 636: Performed by: RADIOLOGY

## 2022-11-07 PROCEDURE — 93306 TTE W/DOPPLER COMPLETE: CPT | Performed by: INTERNAL MEDICINE

## 2022-11-07 PROCEDURE — 82607 VITAMIN B-12: CPT

## 2022-11-07 PROCEDURE — 83036 HEMOGLOBIN GLYCOSYLATED A1C: CPT

## 2022-11-07 RX ORDER — ALPRAZOLAM 0.5 MG/1
0.5 TABLET ORAL
Qty: 10 TABLET | Refills: 0 | Status: SHIPPED | OUTPATIENT
Start: 2022-11-07 | End: 2022-11-17

## 2022-11-07 RX ADMIN — ASPIRIN 81 MG 81 MG: 81 TABLET ORAL at 10:05

## 2022-11-07 RX ADMIN — IOPAMIDOL 100 ML: 755 INJECTION, SOLUTION INTRAVENOUS at 12:14

## 2022-11-07 NOTE — PROGRESS NOTES
Problem: Self Care Deficits Care Plan (Adult)  Goal: *Acute Goals and Plan of Care (Insert Text)  Description: FUNCTIONAL STATUS PRIOR TO ADMISSION: Patient was independent with ADLs, IADLs, and functional mobility/ ambulatory without AD. Working as a nurse/ . HOME SUPPORT: Patient resided with her  and did not require assistance. Occupational Therapy Goals  Initiated 11/7/2022  1. Patient will perform simulated home management tasks with modified independence within 7 days. 2.  Patient will perform simulated work/ office tasks with modified independence within 7 days. 3.  Patient will perform simulated medication management with modified independence within 7 days. 4.  Patient will perform simulated meal preparation with modified independence within 7 days. 5.  Patient will improve MOCA score by 4 points within 7 days. Outcome: Not Met     OCCUPATIONAL THERAPY EVALUATION  Patient: Ted Scruggs (79 y.o. female)  Date: 11/7/2022  Primary Diagnosis: CVA (cerebral vascular accident) [facility]) [I63.9]       Precautions: none       ASSESSMENT  Note patient, who was fully independent and working as a nurse/  at baseline, admitted for CVA work-up due to dizziness/ confusion/ behavioral changes per chart with brain MRI negative for acute process. Patient presents today reporting overall improvement in symptoms but endorses some continued confusion. Note patient mobilized at independent level in PT. For OT she presents with intact physical neuro exam.  [place] [facility]) performed to establish an objective cognitive measure. Patient scored 22/30, and a \"normal\" score is 26 or greater. Points were deducted for visuospatial/ executive functions, attention, language, and delayed recall. Patient reported difficulty completing assessment and indicated her cognition is below baseline. Will follow for OT in acute setting 3x/ week. Recommend outpatient OT at d/c for continue cognitive therapy. Current Level of Function Impacting Discharge (ADLs/self-care): infer independent for basic ADLs but would require assistance with cognitive aspects of IADLs. Functional Outcome Measure: The patient scored Total A-D  Total A-D (Motor Function): 66/66 on the Fugl-Mckeon Assessment which is indicative of no impairment in upper extremity functional status. Patient will benefit from skilled therapy intervention to address the above noted impairments. PLAN :  Recommendations and Planned Interventions: cognitive retraining, patient education, home safety training, and family training/education    Frequency/Duration: Patient will be followed by occupational therapy 3 times a week to address goals. Recommendation for discharge: (in order for the patient to meet his/her long term goals)  Outpatient occupational therapy follow up recommended for impaired cognition    This discharge recommendation:  Has been made in collaboration with the attending provider and/or case management       SUBJECTIVE:   Patient stated I'm still a little confused.     OBJECTIVE DATA SUMMARY:   HISTORY:   Past Medical History:   Diagnosis Date    Anxiety     Vitamin D deficiency      Past Surgical History:   Procedure Laterality Date    HX HERNIA REPAIR       Expanded or extensive additional review of patient history:     Home Situation  Home Environment: Private residence  # Steps to Enter: 3  Rails to Enter: No  One/Two Story Residence: Two story  # of Interior Steps: 13  Living Alone: No  Support Systems: Spouse/Significant Other  Patient Expects to be Discharged to[de-identified] Home  Current DME Used/Available at Home: None, Grab bars  Tub or Shower Type: Shower      EXAMINATION OF PERFORMANCE DEFICITS:  Cognitive/Behavioral Status:  Neurologic State: Alert  Orientation Level: Oriented X4  Cognition: Follows commands             Skin: visible skin appears intact    Edema: none noted    Hearing: WDL    Vision/Perceptual:    Tracking: Able to track stimulus in all quadrants w/o difficulty              Visual Fields:  (intact)                 Range of Motion:    AROM: Within functional limits  PROM: Within functional limits                      Strength:    Strength: Within functional limits                Coordination:  Coordination: Within functional limits  Fine Motor Skills-Upper: Left Intact; Right Intact    Gross Motor Skills-Upper: Left Intact; Right Intact    Tone & Sensation:    Tone: Normal  Sensation: Intact                   ADL Assessment:  Feeding: Independent (inferred)    Oral Facial Hygiene/Grooming: Independent (inferred)    Bathing: Independent (inferred)         Upper Body Dressing: Independent (inferred)    Lower Body Dressing: Independent (inferred)    Toileting: Independent (inferred,  has been up ad rich to bathroom)         Functional Measure:  Fugl-Mckeon Assessment of Motor Recovery after Stroke:   Upper Extremity Assessment: Yes (each UE)    Reflex Activity  Flexors/Biceps/Fingers: Can be elicited  Extensors/Triceps: Can be elicited  Reflex Subtotal: 4    Volitional Movement Within Synergies  Shoulder Retraction: Full  Shoulder Elevation: Full  Shoulder Abduction (90 degrees): Full  Shoulder External Rotation: Full  Elbow Flexion: Full  Forearm Supination: Full  Shoulder Adduction/Internal Rotation: Full  Elbow Extension: Full  Forearm Pronation: Full  Subtotal: 18    Volitional Movement Mixing Synergies  Hand to Lumbar Spine: Full  Shoulder Flexion (0-90 degrees): Full  Pronation-Supination: Full  Subtotal: 6    Volitional Movement With Little or No Synergy  Shoulder Abduction (0-90 degrees): Full  Shoulder Flexion ( degrees): Full  Pronation/Supination: Full  Subtotal : 6    Normal Reflex Activity  Biceps, Triceps, Finger Flexors:  Full  Subtotal : 2    Upper Extremity Total   Upper Extremity Total: 36    Wrist  Stability at 15 Degree Dorsiflexion: Full  Repeated Dorsiflexion/ Volar Flexion: Full  Stability at 15 Degree Dorsiflexion: Full  Repeated Dorsiflexion/ Volar Flexion: Full  Circumduction: Full  Wrist Total: 10    Hand  Mass Flexion: Full  Mass Extension: Full  Grasp A: Full  Grasp B: Full  Grasp C: Full  Grasp D: Full  Grasp E: Full  Hand Total: 14    Coordination/Speed  Tremor: None  Dysmetria: None  Time: <1s  Coordination/Speed Total : 6    Total A-D  Total A-D (Motor Function): 66/66     This is a reliable/valid measure of arm function after a neurological event. It has established value to characterize functional status and for measuring spontaneous and therapy-induced recovery; tests proximal and distal motor functions. Fugl-Mckeon Assessment - UE scores recorded between five and 30 days post neurologic event can be used to predict UE recovery at six months post neurologic event. Severe = 0-21 points   Moderately Severe = 22-33 points   Moderate = 34-47 points   Mild = 48-66 points  FERNANDA Fairbanks, CRISTY Mart, & TAMARA Rodriguez (1992). Measurement of motor recovery after stroke: Outcome assessment and sample size requirements.  Stroke, 23, pp. 6020-9226.   ------------------------------------------------------------------------------------------------------------------------------------------------------------------  MCID:  Stroke:   Brian Angel et al, 2001; n = 171; mean age 79 (5) years; assessed within 16 (12) days of stroke, Acute Stroke)  FMA Motor Scores from Admission to Discharge   10 point increase in FMA Upper Extremity = 1.5 change in discharge FIM   10 point increase in FMA Lower Extremity = 1.9 change in discharge FIM  MDC:   Stroke:   Cathy Villela et al, 2008, n = 14, mean age = 59.9 (14.6) years, assessed on average 14 (6.5) months post stroke, Chronic Stroke)   FMA = 5.2 points for the Upper Extremity portion of the assessment     Occupational Therapy Evaluation Charge Determination   History Examination Decision-Making   LOW Complexity : Brief history review  LOW Complexity : 1-3 performance deficits relating to physical, cognitive , or psychosocial skils that result in activity limitations and / or participation restrictions  LOW Complexity : No comorbidities that affect functional and no verbal or physical assistance needed to complete eval tasks       Based on the above components, the patient evaluation is determined to be of the following complexity level: LOW   Pain Rating:  Patient did not report pain    Activity Tolerance:   Good    After treatment patient left in no apparent distress:    Supine in bed, Call bell within reach, and Caregiver / family present    COMMUNICATION/EDUCATION:   The patients plan of care was discussed with: Physical therapist, Registered nurse, and Case management. Patient was educated regarding her deficit(s) of impaired cognition as this relates to her diagnosis of CVA work-up. She demonstrated Good understanding as evidenced by verbal response. Patient and/or family was verbally educated on the BE FAST acronym for signs/symptoms of CVA and TIA. BE FAST was written on patient's communication board  for visual education and reinforcement. All questions answered with patient indicating good understanding. Home safety education was provided and the patient/caregiver indicated understanding., Patient/family have participated as able in goal setting and plan of care. , and Patient/family agree to work toward stated goals and plan of care. This patients plan of care is appropriate for delegation to [facility].     Thank you for this referral.  Wali Cristina, OT  Time Calculation: 22 mins

## 2022-11-07 NOTE — PROGRESS NOTES
Problem: Patient Education: Go to Patient Education Activity  Goal: Patient/Family Education  Outcome: Progressing Towards Goal     Problem: Falls - Risk of  Goal: *Absence of Falls  Description: Document [place] Embs Fall Risk and appropriate interventions in the flowsheet.   Outcome: Progressing Towards Goal  Note: Fall Risk Interventions:       Mentation Interventions: Bed/chair exit alarm, Door open when patient unattended, Increase mobility         Elimination Interventions: Call light in reach, Bed/chair exit alarm, Patient to call for help with toileting needs, Stay With Me (per policy)              Problem: Patient Education: Go to Patient Education Activity  Goal: Patient/Family Education  Outcome: Progressing Towards Goal

## 2022-11-07 NOTE — PROGRESS NOTES
Neurology Progress Note     NAME: Mikal Gong   :  1970   MRN:  [id number]   DATE:  2022    Assessment:     Active Problems:    CVA (cerebral vascular accident) ([facility] [address] (2022)      Pt is a 46yo RH female with distant h/o bipolar d/o with acute imbalance and behavior changes in the last week, becoming acutely worse while on vacation at the [facility] this weekend, at one point c/o SOB and abdominal pain, becoming paranoid and belligerent toward her . Has family h/o dementia in her father. CTH at OSH with left frontal hypodensity concerning for possible stroke. Labs at OSH: EKG with NSR. UA unremarkable. WBC 5.9. Glu 107, BUN/Cr 12/0.87. COVID-19 neg. Lactate 0.7. TSH 1.29, LFTs normal. Verito/Lip normal.    Exam - alert and oriented, appropriate, poor memory of weekend events, o/w nonfocal.   MRI brain 22 with minimal CIWM changes, no stroke, no mass. CTA H/N with beading in cervical ICAs c/w FMD, o/w unremarkable. TTE wwith EF 55-60%, +PFO. EEG was normal.   , HDL 51. HgbA1C 5.0, B12 337, Am 29. CBC and CMP were unremarkable. This was not a TIA or CVA. Pt with acute on subacute encephalopathy, now improving. DDx includes toxic/metabolic though no issue identified at this time, primary psychiatric disorder confounded by insomnia, early onset dementia with family h/o dementia in her father, and seizure is still a possibility with post-ictal psychosis. No prior h/o seizure, no epileptogenic focus on MRI or EEG, would not start empiric ASD at this time. Plan:   -UDS was never done, urine not provided  -Heavy metal screen is pending  -Recommend fu with a local neurologist and neuropsychological testing. Subjective:    Pt is seen with her  at bedside. Seen by psychiatry who deemed her safe for discharge.  OT noted [facility], Ne score 22/30 with visuospatial and executive dysfunction, and difficulties with attn, language, and delayed recall. Pt noticed imbalance.  noted to always be present by her bedside, assisted her to the restroom each time, and noted to often answer for her or speak over her with psych SW visit. Objective:   Chart reviewed since last seen    Current Facility-Administered Medications   Medication Dose Route Frequency    acetaminophen (TYLENOL) tablet 650 mg  650 mg Oral Q4H PRN    Or    acetaminophen (TYLENOL) solution 650 mg  650 mg Per NG tube Q4H PRN    Or    acetaminophen (TYLENOL) suppository 650 mg  650 mg Rectal Q4H PRN    ondansetron (ZOFRAN) injection 4 mg  4 mg IntraVENous Q6H PRN    polyethylene glycol (MIRALAX) packet 17 g  17 g Oral DAILY PRN    aspirin chewable tablet 81 mg  81 mg Oral DAILY    LORazepam (ATIVAN) tablet 0.5 mg  0.5 mg Oral Q6H PRN       Visit Vitals  /68   Pulse 93   Temp 97.9 °F (36.6 °C)   Resp 16   SpO2 97%     Temp (24hrs), Av.1 °F (36.7 °C), Min:97.9 °F (36.6 °C), Max:98.3 °F (36.8 °C)      No intake/output data recorded.  1901 -  0700  In: 391.3 [P.O.:240; I.V.:151.3]  Out: -       Physical Exam:  General: Well developed well nourished patient in no apparent distress. Cardiac: Regular rate and rhythm with no murmurs. Carotids: 2+ symmetric, no bruits  Extremities: 2+ Radial pulses, no cyanosis or edema    Neurological Exam:  Mental Status: Oriented to time, place and person. Speech and language intact. Attention and fund of knowledge appropriate. Still with spotty memory of weekend, but better able to recall events from hospitalization. Cranial Nerves:   EOMI, no nystagmus, no ptosis. Facial movement is symmetric.   Hearing is intact    Motor:  Normal motor function   Reflexes:      Sensory:      Gait:     Cerebellar:           Lab Review   Recent Results (from the past 24 hour(s))   GLUCOSE, POC    Collection Time: 22  4:50 PM   Result Value Ref Range    Glucose (POC) 122 (H) 65 - 117 mg/dL    Performed by Stephanie Handy   PCT    LIPID PANEL    Collection Time: 11/07/22  1:44 AM   Result Value Ref Range    Cholesterol, total 194 <200 MG/DL    Triglyceride 98 <150 MG/DL    HDL Cholesterol 51 MG/DL    LDL, calculated 123.4 (H) 0 - 100 MG/DL    VLDL, calculated 19.6 MG/DL    CHOL/HDL Ratio 3.8 0.0 - 5.0     METABOLIC PANEL, COMPREHENSIVE    Collection Time: 11/07/22  1:44 AM   Result Value Ref Range    Sodium 140 136 - 145 mmol/L    Potassium 3.8 3.5 - 5.1 mmol/L    Chloride 108 97 - 108 mmol/L    CO2 24 21 - 32 mmol/L    Anion gap 8 5 - 15 mmol/L    Glucose 96 65 - 100 mg/dL    BUN 15 6 - 20 MG/DL    Creatinine 0.69 0.55 - 1.02 MG/DL    BUN/Creatinine ratio 22 (H) 12 - 20      eGFR >60 >60 ml/min/1.73m2    Calcium 9.2 8.5 - 10.1 MG/DL    Bilirubin, total 0.7 0.2 - 1.0 MG/DL    ALT (SGPT) 28 12 - 78 U/L    AST (SGOT) 19 15 - 37 U/L    Alk.  phosphatase 38 (L) 45 - 117 U/L    Protein, total 6.5 6.4 - 8.2 g/dL    Albumin 3.6 3.5 - 5.0 g/dL    Globulin 2.9 2.0 - 4.0 g/dL    A-G Ratio 1.2 1.1 - 2.2     CBC W/O DIFF    Collection Time: 11/07/22  1:44 AM   Result Value Ref Range    WBC 7.7 3.6 - 11.0 K/uL    RBC 4.53 3.80 - 5.20 M/uL    HGB 14.7 11.5 - 16.0 g/dL    HCT 41.9 35.0 - 47.0 %    MCV 92.5 80.0 - 99.0 FL    MCH 32.5 26.0 - 34.0 PG    MCHC 35.1 30.0 - 36.5 g/dL    RDW 11.6 11.5 - 14.5 %    PLATELET 562 160 - 911 K/uL    MPV 10.5 8.9 - 12.9 FL    NRBC 0.0 0  WBC    ABSOLUTE NRBC 0.00 0.00 - 0.01 K/uL   MAGNESIUM    Collection Time: 11/07/22  1:44 AM   Result Value Ref Range    Magnesium 2.3 1.6 - 2.4 mg/dL   HEMOGLOBIN A1C WITH EAG    Collection Time: 11/07/22  1:46 AM   Result Value Ref Range    Hemoglobin A1c 5.0 4.0 - 5.6 %    Est. average glucose 97 mg/dL   ECHO ADULT COMPLETE    Collection Time: 11/07/22 11:01 AM   Result Value Ref Range    IVSd 1.0 (A) 0.6 - 0.9 cm    LVIDd 4.0 3.9 - 5.3 cm    LVIDs 2.4 cm    LVPWd 1.2 (A) 0.6 - 0.9 cm LA Diameter 2.9 cm    AV Peak Gradient 5 mmHg    AV Peak Velocity 1.2 m/s    LV E' Lateral Velocity 7 cm/s    TAPSE 2.7 1.7 cm    Fractional Shortening 2D 40 28 - 44 %    LV RWT Ratio 0.60     LV Mass 2D 145.6 67 - 162 g   GLUCOSE, POC    Collection Time: 11/07/22 11:34 AM   Result Value Ref Range    Glucose (POC) 109 65 - 117 mg/dL    Performed by Chace Le    VITAMIN B12    Collection Time: 11/07/22  1:26 PM   Result Value Ref Range    Vitamin B12 337 193 - 986 pg/mL   AMMONIA    Collection Time: 11/07/22  1:26 PM   Result Value Ref Range    Ammonia, plasma 29 <32 UMOL/L       Imaging Review   MRI Results (most recent):  Results from Hospital Encounter encounter on 11/06/22    MRI BRAIN WO CONT    Narrative  EXAM: MRI BRAIN WO CONT    INDICATION: TIA    COMPARISON: None. CONTRAST: None. TECHNIQUE:  Multiplanar multisequence acquisition without contrast of the brain. FINDINGS:  The ventricles are normal in size and position. Few scattered white matter  T2/FLAIR hyperintensities, consistent with minimal chronic microvascular  ischemic disease. There is no acute infarct, hemorrhage, extra-axial fluid  collection, or mass effect. There is no cerebellar tonsillar herniation. Expected arterial flow-voids are present. The paranasal sinuses are clear. Trace bilateral mastoid effusions. The orbital  contents are within normal limits. No significant osseous or scalp lesions are  identified. Impression  1. No acute intracranial abnormality. 2. Minimal chronic microvascular ischemic disease. CT Results (most recent):  Results from Hospital Encounter encounter on 11/06/22    CTA HEAD NECK W CONT    Narrative  CLINICAL HISTORY: Cerebrovascular accident    EXAMINATION:  CT ANGIOGRAPHY HEAD AND NECK    COMPARISON: November 6, 2022    TECHNIQUE:  Following the uneventful administration of iodinated contrast  material, axial CT angiography of the head and neck was performed.  Delayed axial  images through the head were also obtained. Coronal and sagittal reconstructions  were obtained. Manual postprocessing of images was performed. 3-D  Sagittal  maximal intensity projection images were obtained. 3-D Coronal maximal  intensity projections were obtained. CT dose reduction was achieved through use  of a standardized protocol tailored for this examination and automatic exposure  control for dose modulation. This study was analyzed by the [address] ai algorithm. FINDINGS:    Delayed contrast-enhanced head CT:    The ventricles are midline without hydrocephalus. There is no acute intra or  extra-axial hemorrhage. The basal cisterns are clear. The paranasal sinuses are  clear. CTA NECK:    Great vessels: Normal arch anatomy with the origins patent. Right subclavian artery: Patent    Left subclavian artery: Patent    Right common carotid artery: Patent    Left common carotid artery: Patent    Cervical right internal carotid artery: Patent with no significant stenosis by  NASCET criteria. Beading of the distal cervical internal carotid artery  consistent with fibromuscular dysplasia. Cervical left internal carotid artery: Patent with no significant stenosis by  NASCET criteria. Beading of the distal cervical internal carotid artery  consistent with fibromuscular dysplasia. Right vertebral artery: Patent    Left vertebral artery: Patent    The lung apices are clear. The thyroid is homogeneous. No cervical  lymphadenopathy.     CTA HEAD:    Right cavernous internal carotid artery: Patent    Left cavernous internal carotid artery: Patent    Anterior cerebral arteries: Patent    Anterior communicating artery: Patent    Right middle cerebral artery: Patent    Left middle cerebral artery: Patent    Posterior communicating arteries: Patent    Posterior cerebral arteries: Patent    Basilar artery: Patent    Distal vertebral arteries: Patent    No evidence for intracranial aneurysm or hemodynamically significant stenosis. Impression  No large vessel occlusion or hemodynamically significant carotid stenosis. Beading of the internal carotid arteries bilaterally consistent with  fibromuscular dysplasia. Care Plan discussed with:  Patient x   Family x   RN    Care Manager    Consultant/Specialist:  dennis     Signed: Alfred Abernathy MD

## 2022-11-07 NOTE — BSMART NOTE
Initial BSMART Liaison Assessment Form     Section I - Integrated Summary    Chief Complaint is confusion, dizziness. LOS:  1     Presenting problem/Summary:  Pt came to [facility] 11/6/22 c/o confusion and dizziness x 2 days, while she and  were visiting [facility]. Pt was at home, becoming more irritable and irrational, per , who called EMS. Pt was transferred to [facility] for further evaluation and possible stroke. IP Psychiatry was consulted 11/6/22 for anxiety. Pt was received resting in bed with  at bedside. She was alert, oriented and calm. She denies any issues with depression, anxiety, SI, HI and AVH at this time. She reported feeling better after having some sleep. She is unable to identify triggers other than issues sleeping the past week.  suggested \"empty nesting\" with youngest son at [address] for his first semester and \"post menopause\" issues may have contributed to stress level. Pt was able to communicate clearly, but  frequently answered for her or spoke over her while she was talking. Pt speech was clear and eye contact good. Thoughts were logical and linear. She was free from any delusions or hallucination. She is not currently working with an OP [place] [address] provider. She reported that she was hospitalized twice (teens and 17's) and given the diagnosis of bipolar. However, she has never been in treatment for bipolar and has not had any unamanged/breakthrough symptoms in the past 30+ years. She reports her only issue is anxiety \"some days. \" She describes feeling anxious throughout the day and \"ignoring\" it until nighttime. When she tries to go to sleep it is very difficult to relax both physically and mentally. She requires xanax to help her sleep, and says this has been for 30+ years. She is open to working with a therapist to gain coping skills around anxiety.  MSW educated on progressive muscle relaxation to assist with decreasing anxiety and tension when trying to fall asleep and Pt agreed to try this coping strategy. She does not have any unmanaged mental health needs at this time. She does not required psychiatric admission. BSMART liaison will follow weekly for support. Precipitant Factors are sleep deprivation x1 week. The information is given by the patient, spouse/SO, and past medical records. Current Psychiatrist and/or  is none. Previous Hospitalizations/Treatment: hospitalization x 2 for bipolar d/o in teens or 25s. Lethality Assessment:  The potential for suicide noted by the following: not noted . The potential for homicide is not noted. The patient has not been a perpetrator of sexual or physical abuse. There are not pending charges. The patient is not felt to be at risk for self-harm or harm to others. Section II - Psychosocial  The patient's overall mood and attitude is calm. Feelings of helplessness and hopelessness are not observed. Generalized anxiety is not observed. Panic is not observed. Phobias are not observed. Obsessive compulsive tendencies are not observed. Section III - Mental Status Exam  The patient's appearance shows no evidence of impairment. The patient's behavior shows no evidence of impairment. The patient is oriented to time, place, person and situation. The patient's speech shows no evidence of impairment. The patient's mood calm. The range of affect shows no evidence of impairment. The patient's thought content demonstrates no evidence of impairment. The thought process shows no evidence of impairment. The patient's perception shows no evidence of impairment. The patient's memory shows no evidence of impairment. The patient's appetite shows no evidence of impairment. The patient's sleep has evidence of insomnia. The patient's insight shows no evidence of impairment. The patient's judgement shows no evidence of impairment.       The patient has demonstrated mental capacity to provide informed consent. Section IV - Substance Abuse  The patient is using substances. The patient is using alcohol. The patient has experienced the following withdrawal symptoms: N/A. Section V - Medical  Past Medical History:   Diagnosis Date    Anxiety     Vitamin D deficiency        Section VI - Living Arrangements  The patient is . The patient lives with a spouse. The patient has 2 children in college. The patient does plan to return home upon discharge. The patient's source of income comes from employment. The patient's greatest support comes from  and this person will be involved with the treatment. The patient has not been in an event described as horrible or outside the realm of ordinary life experience either currently or in the past. The patient has not been a victim of sexual/physical abuse. 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 The patient is currently employed and speaks [place] as a primary language. The patient has no communication impairments affecting communication. The patient's preference for learning can be described as: can read and write adequately.   The patient's hearing is normal.  The patient's vision is normal.    The patient reports coping skills include: michael Escobar

## 2022-11-07 NOTE — PROGRESS NOTES
[facility] Adult  Hospitalist Group                                                                                          Hospitalist Progress Note  Lupis Hutchinson MD  Answering service: [phone] from in house phone        Date of Service:  2022  NAME:  Kevin Wilson  :  1970  MRN:  [id number]      Admission Summary:   Kevin Wilson is a 45 y.o. female is known past medical history of a  10 required Xanax as needed, history of bipolar disorder in the past who presents to ED outside of [facility] hospital with complaint of dizziness, confusion  For 2 days. The patient never had similar problem before, she denied having any chest pain, fever, palpitation, loss of conscious or seizure. Per patient  who is physician was noted patient become irritable and irrational in her decision. He called ambulance and patient was brought to emergency department for further evaluation. In the emergency department laboratory data was obtained eventually work-up was negative, CT head without contrast was suspicious for left frontal lobe possible ischemic changes. The patient was transferred to [facility] for admission and further evaluation of possible stroke.     Interval history / Subjective:   Patient seen and examined discussed with neurology awaiting urine drug screen also awaiting CTA patient alert oriented x3     Assessment & Plan:     Acute metabolic encephalopathy with behavioral disturbances  --Unclear etiology urine drug screen pending  --CTA pending as well  --MRI no acute pathology    Anxiety bipolar disorder  --Continue home medication    Hypertension  --Currently patient is normotensive     patient will need a statin at discharge    Code status: Full code  DVT prophylaxis: [address] discussed with: Patient/Family and Nurse  Anticipated Disposition: Home w/Family  Anticipated Discharge: Less than 24 hours     Hospital Problems  Never Reviewed            Codes Class Noted POA    CVA (cerebral vascular accident) [facility]) ICD-10-CM: I63.9  ICD-9-CM: 434.91  11/6/2022 Unknown             Review of Systems:   A comprehensive review of systems was negative. Vital Signs:    Last 24hrs VS reviewed since prior progress note. Most recent are:  Visit Vitals  /68   Pulse 93   Temp 97.9 °F (36.6 °C)   Resp 16   SpO2 97%         Intake/Output Summary (Last 24 hours) at 11/7/2022 1326  Last data filed at 11/7/2022 0148  Gross per 24 hour   Intake 391.25 ml   Output --   Net 391.25 ml        Physical Examination:     I had a face to face encounter with this patient and independently examined them on 11/7/2022 as outlined below:          General : alert x 3, awake, no acute distress, resting in bed, pleasant   HEENT: PEERL, EOMI, moist mucus membrane, TM clear  Neck: supple, no JVD, no meningeal signs  Chest: Clear to auscultation bilaterally   CVS: S1 S2 heard, Capillary refill less than 2 seconds  Abd: soft/ Non tender, non distended, BS physiological,   Ext: no clubbing, no cyanosis, no edema, brisk 2+ DP pulses  Neuro/Psych: pleasant mood and affect, CN 2-12 grossly intact  Skin: warm     Data Review:    I personally reviewed  Image and LABS      Labs:     Recent Labs     11/07/22  0144   WBC 7.7   HGB 14.7   HCT 41.9        Recent Labs     11/07/22  0144      K 3.8      CO2 24   BUN 15   CREA 0.69   GLU 96   CA 9.2   MG 2.3     Recent Labs     11/07/22 0144   ALT 28   AP 38*   TBILI 0.7   TP 6.5   ALB 3.6   GLOB 2.9     No results for input(s): INR, PTP, APTT, INREXT in the last 72 hours. No results for input(s): FE, TIBC, PSAT, FERR in the last 72 hours. No results found for: FOL, RBCF   No results for input(s): PH, PCO2, PO2 in the last 72 hours. No results for input(s): CPK, CKNDX, TROIQ in the last 72 hours.     No lab exists for component: CPKMB  Lab Results   Component Value Date/Time    Cholesterol, total 194 11/07/2022 01:44 AM    HDL Cholesterol 51 11/07/2022 01:44 AM    LDL, calculated 123.4 (H) 11/07/2022 01:44 AM    Triglyceride 98 11/07/2022 01:44 AM    CHOL/HDL Ratio 3.8 11/07/2022 01:44 AM     Lab Results   Component Value Date/Time    Glucose (POC) 109 11/07/2022 11:34 AM    Glucose (POC) 122 (H) 11/06/2022 04:50 PM     No results found for: COLOR, APPRN, SPGRU, REFSG, RENEE, PROTU, GLUCU, KETU, BILU, UROU, NGUYEN, LEUKU, GLUKE, EPSU, BACTU, WBCU, RBCU, CASTS, UCRY      Medications Reviewed:     Current Facility-Administered Medications   Medication Dose Route Frequency    acetaminophen (TYLENOL) tablet 650 mg  650 mg Oral Q4H PRN    Or    acetaminophen (TYLENOL) solution 650 mg  650 mg Per NG tube Q4H PRN    Or    acetaminophen (TYLENOL) suppository 650 mg  650 mg Rectal Q4H PRN    0.9% sodium chloride infusion  75 mL/hr IntraVENous CONTINUOUS    ondansetron (ZOFRAN) injection 4 mg  4 mg IntraVENous Q6H PRN    polyethylene glycol (MIRALAX) packet 17 g  17 g Oral DAILY PRN    aspirin chewable tablet 81 mg  81 mg Oral DAILY    LORazepam (ATIVAN) tablet 0.5 mg  0.5 mg Oral Q6H PRN     ______________________________________________________________________  EXPECTED LENGTH OF STAY: - - -  ACTUAL LENGTH OF STAY:          1      Please note that this dictation was completed with SureSpeak, the computer voice recognition software. Quite often unanticipated grammatical, syntax, homophones, and other interpretive errors are inadvertently transcribed by the computer software. Please disregard these errors. Please excuse any errors that have escaped final proofreading.                 Bonifacio Monk MD

## 2022-11-07 NOTE — DISCHARGE INSTRUCTIONS
Discharge Instructions       PATIENT ID: July Garay  MRN: [id number]   YOB: 1970    DATE OF ADMISSION: [unfilled]    DATE OF DISCHARGE: 11/7/2022    PRIMARY CARE PROVIDER: @PCP@     ATTENDING PHYSICIAN: [unfilled]  DISCHARGING PROVIDER: Ilana Sanz MD    To contact this individual call [phone] and ask the  to page. If unavailable ask to be transferred the Adult Hospitalist Department. DISCHARGE DIAGNOSES Acute metabolic encephalopathy with behavioral disturbances    CONSULTATIONS: [unfilled]    PROCEDURES/SURGERIES: * No surgery found *    PENDING TEST RESULTS:   At the time of discharge the following test results are still pending:     FOLLOW UP APPOINTMENTS:   [unfilled]     ADDITIONAL CARE RECOMMENDATIONS:     DIET: Regular Diet    ACTIVITY: Activity as tolerated    WOUND CARE:     EQUIPMENT needed:       Radiology      DISCHARGE MEDICATIONS:   See Medication Reconciliation Form    It is important that you take the medication exactly as they are prescribed. Keep your medication in the bottles provided by the pharmacist and keep a list of the medication names, dosages, and times to be taken in your wallet. Do not take other medications without consulting your doctor. NOTIFY YOUR PHYSICIAN FOR ANY OF THE FOLLOWING:   Fever over 101 degrees for 24 hours. Chest pain, shortness of breath, fever, chills, nausea, vomiting, diarrhea, change in mentation, falling, weakness, bleeding. Severe pain or pain not relieved by medications. Or, any other signs or symptoms that you may have questions about.       DISPOSITION:  x  Home With:   OT  PT  HH  RN       SNF/Inpatient Rehab/LTAC    Independent/assisted living    Hospice    Other:     CDMP Checked:   Yes x     PROBLEM LIST Updated:  Yes x       Signed:   Ilana Sanz MD  11/7/2022  5:33 PM

## 2022-11-07 NOTE — PROGRESS NOTES
Speech pathology  Orders received, chart reviewed. Note patient in with dizziness, confusion, behavioral changes. OT evaluated and noted cognitive deficits and recommended follow up OP OT to address this. Patient passed [place] swallow screen and has been tolerating a regular diet/ thin liquids. Per neuro (prior to MRI completion), \"DDx includes stroke, mass, primary psych issue, toxic or metabolic encephalopathy\". Note MRI negative for acute infarct. Formal speech/swallow evaluation not indicated at this time. Will complete orders.      Thanks,   Karyle Parry M.S. CCC-SLP

## 2022-11-07 NOTE — PROGRESS NOTES
Received report around 0100. Patient and  awake in bed. Patient tired and flat affect. Dena's RN padma labs. Q4 vitals taken. No c/o pain. Patient aware UA pending collection. Reporting off to [facility].
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
## 2022-11-07 NOTE — CONSULTS
Neurology Consult Note     NAME: Ted Scruggs   :  1970   MRN:  [id number]   DATE:  2022       HPI:  Pt is a 46yo RH female who [facility]. Patient is seen with her  lying in bed beside her, he specifically called prior to their arrival and told the nursing staff that he did not want anyone talking to her until he arrived. [de-identified] of history is obtained from patient's , she only has sketchy memory of the events of the weekend. Pt was visiting the [facility] with her  and c/o dizziness on the night of 22. Dizziness is clarified is feeling off balance. Patient's  states that he was afraid she was going to fall. He checked her pulse which was normal and she seemed fine. Noted that she was very tired and so they went to sleep. On Saturday morning they were driving to get her some shoes because she had forgotten her ears and he noted that she was very repetitive saying the same phrase 10 times and seemed to talk off topic. He notes that she had been sleep deprived all week and was very tearful prior to the trip and continued to be tearful on Saturday. When they returned to the [facility] after she shopping, they decided to take a nap instead of going for bike ride as they had planned. Upon awakening, she was acting more bizarre, crying stated she wanted a divorce and had created a long story stating this had been festering and that she just cannot be with him. She then started talking about her children in an odd way stating things such as they would not miss her. He notes that they are all very close and that this was a very out of character.   As the evening wore on she became more belligerent and he tried to talk her into going to the emergency department but she insisted on not going. At one point she was talking with her children on the phone and they were simultaneously texting him and stating that \"mom seems off. \"  When she would not agreed to go to the ED he talked her into getting up and going home in the morning and aborting their vacation, she agreed to go to see her physician at home. Upon awakening this morning, she complained of being SOB and having abdominal pain. He notes that clinically, neither of these things seemed true, she then started telling him that he needed to \"fix this now. \" She started to leave the room naked, she then told him he would have to leave and forced him into the hallway naked, then handed him a robe. He walked down to the  and had them call 911. He noted that in the emergency department she was paranoid and belligerent, cursing at people, something very out of character for her. CTH with small focus of subtle decreased gray-white differentiation in left frontal lobe, possible subacute infarct. She was given ASA 325mg in ED. Pt's  requested transfer to [facility] for further evaluation. Upon notification they would be transferred to [facility], she required Ativan in order to get into the ambulance. She just revealed to him during all this that she had a distant h/o hospitalization x 2 for bipolar d/o in teens or 25s. She was noted to be A&Ox 3 at the outside ED. EKG with NSR. UA unremarkable. WBC 5.9. Glu 107, BUN/Cr 12/0.87. COVID-19 neg. Lactate 0.7. TSH 1.29, LFTs normal. Verito/Lip normal.  Patient denies alcohol use. Denies using tramadol this weekend. That she has not been sleeping for the last couple of weeks off and on, no particular reason.  notes that she is not back to her baseline and is more confused than she appears at the moment. Patient has no known stroke risk factors. Family history of dementia in her father no other known neurological disorders.     PMH:  Bipolar d/o   Anxiety  GERD  Vit D def  Right ing hernia repair  Breast implants    ROS:  Per HPI or past medical history, though limited due to patient factors    MEDS:  Home:  Prior to Admission Medications   Prescriptions Last Dose Informant Patient Reported? Taking? ALPRAZolam (XANAX) 0.5 mg tablet 11/5/2022  Yes Yes   Sig: Take  by mouth nightly as needed. ergocalciferol (ERGOCALCIFEROL) 50,000 unit capsule 10/30/2022  Yes Yes   Sig: Take 50,000 Units by mouth.     eszopiclone (LUNESTA) 2 mg tablet Not Taking  Yes No   Sig: Take  by mouth nightly. Patient not taking: Reported on 11/6/2022   traMADol-acetaminophen (ULTRACET) 37.5-325 mg per tablet Not Taking  Yes No   Sig: Take  by mouth every four (4) hours as needed. Patient not taking: Reported on 11/6/2022      Facility-Administered Medications: None       Current Facility-Administered Medications:     acetaminophen (TYLENOL) tablet 650 mg, 650 mg, Oral, Q4H PRN **OR** acetaminophen (TYLENOL) solution 650 mg, 650 mg, Per NG tube, Q4H PRN **OR** acetaminophen (TYLENOL) suppository 650 mg, 650 mg, Rectal, Q4H PRN, Zahira Majano MD    0.9% sodium chloride infusion, 75 mL/hr, IntraVENous, CONTINUOUS, Zahira Majano MD, Last Rate: 75 mL/hr at 11/06/22 1847, 75 mL/hr at 11/06/22 1847    ondansetron (ZOFRAN) injection 4 mg, 4 mg, IntraVENous, Q6H PRN, Zahira Majano MD    polyethylene glycol (MIRALAX) packet 17 g, 17 g, Oral, DAILY PRN, Cielo Chairez MD    [START ON 11/7/2022] aspirin chewable tablet 81 mg, 81 mg, Oral, DAILY, Zahira Majano MD    LORazepam (ATIVAN) tablet 0.5 mg, 0.5 mg, Oral, Q6H PRN, Cielo Chairez MD, 0.5 mg at 11/06/22 1922      Allergies   Allergen Reactions    Penicillins Unknown (comments)         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 Cardiac: Regular rate and rhythm with no murmurs. Carotids: 2+ symmetric, no bruits  Extremities: 2+ Radial pulses, no cyanosis or edema    Neurological Exam:  Mental Status: Oriented to month, year, \"7th or 8th\", place and person. Speech and language intact. Attention and fund of knowledge appropriate. Recent memory impaired   Cranial Nerves:   VFF, PERRL, EOMI, no nystagmus, no diplopia, no ptosis. Facial sensation is normal. Facial movement is symmetric. Palate is midline. Tongue is midline. Hearing is intact. Motor:  5/5 strength in upper and lower proximal and distal muscles. Normal bulk and tone. No PD. No tremors   Reflexes:   Deep tendon reflexes 2+ and symmetric. Toes downgoing. Sensory:   Intact to LT   Gait:  Slightly unsteady ambulating from bed to [facility]    Cerebellar:  Intact FTN, JERRY          STUDIES AND REPORTS:  Recent Results (from the past 24 hour(s))   GLUCOSE, POC    Collection Time: 22  4:50 PM   Result Value Ref Range    Glucose (POC) 122 (H) 65 - 117 mg/dL    Performed by Stacy Silvestre   PCT      MRI Results (most recent):  No results found for this or any previous visit. CT Results (most recent):  No results found for this or any previous visit. Assessment and Plan:   Pt is a 46yo RH female with distant h/o bipolar d/o with acute imbalance and behavior changes in the last week, becoming acutely worse while on vacation at the [facility] this weekend, at one point c/o SOB and abdominal pain, becoming paranoid and belligerent toward her . Has family h/o dementia in her father. CTH at OSH with left frontal hypodensity concerning for possible stroke.   Exam - alert and oriented, appropriate, poor memory of weekend events, o/w nonfocal. DDx includes stroke, mass, primary psych issue, toxic or metabolic encephalopathy. Recommend starting work up with MRI brain to assess frontal lobe lesion, CTA H/N, TTE, HgbA1C, Lipid profile. Continue ASA 81mg daily. UDS, Heavy metal screen. Signed: Blaise Chavez MD

## 2022-11-07 NOTE — DISCHARGE SUMMARY
Discharge Summary       PATIENT ID: Shawna Shah  MRN: [id number]   YOB: 1970    DATE OF ADMISSION: 11/6/2022  2:04 PM    DATE OF DISCHARGE: 11/7/22   PRIMARY CARE PROVIDER: Flex Mirza MD     ATTENDING PHYSICIAN: Angela Dillard  DISCHARGING PROVIDER: Augusto Laughlin MD    To contact this individual call [phone] and ask the  to page. If unavailable ask to be transferred the Adult Hospitalist Department. CONSULTATIONS: IP CONSULT TO NEUROLOGY  IP CONSULT TO PSYCHIATRY    PROCEDURES/SURGERIES: * No surgery found *    DISCHARGE DIAGNOSES: Altered mental status due to sleep deprivation    Shawna Shah is a 45 y.o. female is known past medical history of a  10 required Xanax as needed, history of bipolar disorder in the past who presents to ED outside of [facility] hospital with complaint of dizziness, confusion  For 2 days. The patient never had similar problem before, she denied having any chest pain, fever, palpitation, loss of conscious or seizure. Per patient  who is physician was noted patient become irritable and irrational in her decision. He called ambulance and patient was brought to emergency department for further evaluation. In the emergency department laboratory data was obtained eventually work-up was negative, CT head without contrast was suspicious for left frontal lobe possible ischemic changes. The patient was transferred to [facility] for admission and further evaluation of possible stroke.     [address] [facility] COURSE:   Patient was admitted and seen by neurology work-up remains negative a urine drug screen was never been obtained seen by neurology as well and an EEG was done and cleared for discharge and if similar symptoms recurs patient  was advised to bring the patient to emergency room on patient request another 10 tablets of Xanax was given to use as needed for sleep patient was also seen by psychiatry and diagnosed as sleep deprivation as well causing her mental breakdown    DISCHARGE DIAGNOSES / PLAN:      Discharge home    BMI: There is no height or weight on file to calculate BMI. . This patient: Has a BMI within normal limits. PENDING TEST RESULTS:   At the time of discharge the following test results are still pending:      ADDITIONAL CARE RECOMMENDATIONS:        NOTIFY YOUR PHYSICIAN FOR ANY OF THE FOLLOWING:   Fever over 101 degrees for 24 hours. Chest pain, shortness of breath, fever, chills, nausea, vomiting, diarrhea, change in mentation, falling, weakness, bleeding. Severe pain or pain not relieved by medications, as well as any other signs or symptoms that you may have questions about. FOLLOW UP APPOINTMENTS:    Follow-up Information       Follow up With Specialties Details Why Contact Info    Berna Alanis MD Family Medicine   [address] [place] [address]  [phone]                 DIET: Cardiac Diet    ACTIVITY: Activity as tolerated    EQUIPMENT needed:     DISCHARGE MEDICATIONS:  Current Discharge Medication List        CONTINUE these medications which have CHANGED    Details   ALPRAZolam (XANAX) 0.5 mg tablet Take 1 Tablet by mouth nightly as needed for Sleep for up to 10 days. Max Daily Amount: 0.5 mg.    Qty: 10 Tablet, Refills: 0  Start date: 11/7/2022, End date: 11/17/2022    Associated Diagnoses: Anxiety           CONTINUE these medications which have NOT CHANGED    Details   ergocalciferol (ERGOCALCIFEROL) 50,000 unit capsule Take 50,000 Units by mouth.        eszopiclone (LUNESTA) 2 mg tablet Take  by mouth nightly. traMADol-acetaminophen (ULTRACET) 37.5-325 mg per tablet Take  by mouth every four (4) hours as needed.                DISPOSITION:   x Home With:   OT  PT  HH  RN       Long term SNF/Inpatient Rehab    Independent/assisted living    Hospice    Other:       PATIENT CONDITION AT DISCHARGE:     Functional status    Poor     Deconditioned x Independent      Cognition    x Lucid     Forgetful     Dementia      Catheters/lines (plus indication)    Alegre     PICC     PEG    x None      Code status   x  Full code     DNR      PHYSICAL EXAMINATION AT DISCHARGE:  General:          Alert, cooperative, no distress, appears stated age. HEENT:           Atraumatic, anicteric sclerae, pink conjunctivae                          No oral ulcers, mucosa moist, throat clear, dentition fair  Neck:               Supple, symmetrical  Lungs:             Clear to auscultation bilaterally. No Wheezing or Rhonchi. No rales. Chest wall:      No tenderness  No Accessory muscle use. Heart:              Regular  rhythm,  No  murmur   No edema  Abdomen:        Soft, non-tender. Not distended. Bowel sounds normal  Extremities:     No cyanosis. No clubbing,                            Skin turgor normal, Capillary refill normal  Skin:                Not pale. Not Jaundiced  No rashes   Psych:             Not anxious or agitated.   Neurologic:      Alert, moves all extremities, answers questions appropriately and responds to commands       CHRONIC MEDICAL DIAGNOSES:  Problem List as of 11/7/2022 Never Reviewed            Codes Class Noted - Resolved    CVA (cerebral vascular accident) [facility]) ICD-10-CM: I63.9  ICD-9-CM: 434.91  11/6/2022 - Present           Greater than 30  minutes were spent with the patient on counseling and coordination of care    Signed:   Vianey Magdaleno MD  11/7/2022  6:24 PM

## 2022-11-07 NOTE — PROGRESS NOTES
Transition of Care Plan  RUR- Low  6%  DISPOSITION: Home with spouse  F/U with PCP/Specialist    Transport: Spouse    Reason for Admission:  stroke rule out                     RUR Score:          6%           Plan for utilizing home health:      No hx of home health    PCP: First and Last name:  Angel London MD     Name of Practice:    Are you a current patient: Yes/No: Yes   Approximate date of last visit: 1 month   Can you participate in a virtual visit with your PCP:                     Current Advanced Directive/Advance Care Plan: Full Code      Healthcare Decision Maker: spouse, Devin Pereira   Click here to complete [address] including selection of the Healthcare Decision Maker Relationship (ie \"Primary\")                             Transition of Care Plan:                      CM met with patient and spouse at bedside to introduce self and role. Living situation: lives with spouse in 2 story home, 3 steps to enter  ADLs: independent, works as RN and  for [employer]  DME: none  Previous IPR/SNF: none  Previous home health: none  Demographics: confirmed, [employer] insured. Updated patient's home address  Pharmacy: [address] [facility] point of contact: Devin Pereira  [phone]    CM to follow patient progress and assist as recommended with EARLINE plan. Care Management Interventions  PCP Verified by CM: Yes  Palliative Care Criteria Met (RRAT>21 & CHF Dx)?: No  Mode of Transport at Discharge:  Other (see comment) (spouse)  Transition of Care Consult (CM Consult): Discharge Planning  MyChart Signup: No  Discharge Durable Medical Equipment: No  Health Maintenance Reviewed: Yes  Physical Therapy Consult: Yes  Occupational Therapy Consult: Yes  Speech Therapy Consult: No  Support Systems: Spouse/Significant Other  Confirm Follow Up Transport: Family  The Plan for Transition of Care is Related to the Following Treatment Goals : home  Name of the Patient Representative Who was Provided with a Choice of Provider and Agrees with the Discharge Plan: patient  Discharge Location  Patient Expects to be Discharged to[de-identified] [facility]) [place], M.S.ALEXUS.

## 2022-11-07 NOTE — ADT AUTH CERT NOTES
MEMBER ID [id number]      NOTIFICATION OF INPATIENT ADMISSION       ADMISSION DATE:       [address] ([place]) CONTACT: Adán MCDONALD [phone]      PLEASE FAX BACK REF#, STATUS AND CLINICAL NEEDS TO ME [phone]      [facility] YOU SO MUCH!

## 2022-11-07 NOTE — PROGRESS NOTES
Problem: Falls - Risk of  Goal: *Absence of Falls  Description: Document Carlita Burch Fall Risk and appropriate interventions in the flowsheet.   Outcome: Progressing Towards Goal  Note: Fall Risk Interventions:       Mentation Interventions: Bed/chair exit alarm, Door open when patient unattended, Increase mobility         Elimination Interventions: Call light in reach, Bed/chair exit alarm, Patient to call for help with toileting needs, Stay With Me (per policy)              Problem: Patient Education: Go to Patient Education Activity  Goal: Patient/Family Education  Outcome: Progressing Towards Goal     Problem: Patient Education: Go to Patient Education Activity  Goal: Patient/Family Education  Outcome: Progressing Towards Goal     Problem: TIA/CVA Stroke: 0-24 hours  Goal: Off Pathway (Use only if patient is Off Pathway)  Outcome: Progressing Towards Goal  Goal: Activity/Safety  Outcome: Progressing Towards Goal  Goal: Consults, if ordered  Outcome: Progressing Towards Goal  Goal: Diagnostic Test/Procedures  Outcome: Progressing Towards Goal  Goal: Nutrition/Diet  Outcome: Progressing Towards Goal  Goal: Discharge Planning  Outcome: Progressing Towards Goal  Goal: Medications  Outcome: Progressing Towards Goal  Goal: Respiratory  Outcome: Progressing Towards Goal  Goal: Treatments/Interventions/Procedures  Outcome: Progressing Towards Goal  Goal: Minimize risk of bleeding post-thrombolytic infusion  Outcome: Progressing Towards Goal  Goal: Monitor for complications post-thrombolytic infusion  Outcome: Progressing Towards Goal  Goal: Psychosocial  Outcome: Progressing Towards Goal  Goal: *Hemodynamically stable  Outcome: Progressing Towards Goal  Goal: *Neurologically stable  Description: Absence of additional neurological deficits    Outcome: Progressing Towards Goal  Goal: *Verbalizes anxiety and depression are reduced or absent  Outcome: Progressing Towards Goal  Goal: *Absence of Signs of Aspiration on Current Diet  Outcome: Progressing Towards Goal  Goal: *Absence of deep venous thrombosis signs and symptoms(Stroke Metric)  Outcome: Progressing Towards Goal  Goal: *Ability to perform ADLs and demonstrates progressive mobility and function  Outcome: Progressing Towards Goal  Goal: *Stroke education started(Stroke Metric)  Outcome: Progressing Towards Goal  Goal: *Dysphagia screen performed(Stroke Metric)  Outcome: Progressing Towards Goal  Goal: *Rehab consulted(Stroke Metric)  Outcome: Progressing Towards Goal     Problem: Patient Education: Go to Patient Education Activity  Goal: Patient/Family Education  Outcome: Progressing Towards Goal

## 2022-11-07 NOTE — PROGRESS NOTES
PHYSICAL THERAPY EVALUATION WITH DISCHARGE  Patient: Angela Ly (59 y.o. female)  Date: 11/7/2022  Primary Diagnosis: CVA (cerebral vascular accident) [facility]) [I63.9]       Precautions:          ASSESSMENT  Based on the objective data described below, the patient presents with reports of AMS and impaired balance s/p admission on 11/6 for CVA work up. Pt initially presented to outside hospital where head CT showed:  small focus of subtle decreased gray-white differentiation in left frontal lobe, possible subacute infarct. MRI at [facility] revealed no acute infarct. Pt received walking in the room with spouse at bedside. Pt demonstrated intact and equal LE strength, sensation, and coordination. Pt reported she still feels as if she is having some forgetfulness, however oriented x 4 and reported she slept well last night. Pt completed transfers independently without AD and tolerated gait training around the unit without LOB or instability. Pt completed head turns, and obstacle navigation without issue. Pt reported being very active and independent prior to admission. If pt feels her balance is still off once d/c home, then recommend outpatient PT follow up upon discharge. Functional Outcome Measure: The patient scored Total: 53/56 on the Meza Balance Assessment which is indicative of low fall risk. Other factors to consider for discharge: independent, active, lives with spouse     Further skilled acute physical therapy is not indicated at this time. 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   Personal factors and/or comorbidities impacting plan of care:     Home Situation  Home Environment: Private residence  # Steps to Enter: 3  Rails to Enter: No  One/Two Story Residence: Two story  # of Interior Steps: 13  Living Alone: No  Support Systems: Spouse/Significant Other  Patient Expects to be Discharged to[de-identified] Home  Current DME Used/Available at Home: None, Grab bars  Tub or Shower Type: Shower    EXAMINATION/PRESENTATION/DECISION MAKING:   Critical Behavior:  Neurologic State: Drowsy  Orientation Level: Oriented to person, Oriented to place, Oriented to time, oriented to situation  Cognition: Follows commands       Range Of Motion:  AROM: Within functional limits           PROM: Within functional limits           Strength:    Strength: Within functional limits                    Tone & Sensation:   Tone: Normal              Sensation: Intact               Coordination:  Coordination: Within functional limits  Vision:      Functional Mobility:  Bed Mobility:     Supine to Sit:  (NT--pt received walking in the room)        Transfers:  Sit to Stand: Independent  Stand to Sit: Independent                       Balance:   Sitting: Intact  Standing: Intact  Ambulation/Gait Training:  Distance (ft): 310 Feet (ft)  Assistive Device: Gait belt  Ambulation - Level of Assistance: Supervision; Independent        Gait Abnormalities: Decreased step clearance                 Functional Measure  Meza Balance Test:    Sitting to Standin  Standing Unsupported: 4  Sitting with Back Unsupported: 4  Standing to Sittin  Transfers: 4  Standing Unsupported with Eyes Closed: 4  Standing Unsupported with Feet Together: 4  Reach Forward with Outstretched Arm: 4   Object: 4  Turn to Look Over Shoulders: 4  Turn 360 Degrees: 4  Alternate Foot on Step/Stool: 3  Standing Unsupported One Foot in Front: 3  Stand on One Leg: 3  Total: 53/56         56=Maximum possible score;   0-20=High fall risk  21-40=Moderate fall risk   41-56=Low fall risk           Based on the above components, the patient evaluation is determined to be of the following complexity level: LOW     Pain Rating:  Pt denied pain    Activity Tolerance:   Good    After treatment patient left in no apparent distress:   Call bell within reach, Caregiver / family present, Side rails x 3, and standing in the room with spouse    COMMUNICATION/EDUCATION:   The patients plan of care was discussed with: Occupational therapist, Registered nurse, Physician, and Case management. Patient was educated regarding Her deficit(s) of AMS as this relates to Her diagnosis of CVA r/o. She demonstrated Good understanding    Patient and/or family was verbally educated on the BE FAST acronym for signs/symptoms of CVA and TIA. BE FAST was written on patient's communication board  for visual education and reinforcement. All questions answered with patient indicating fair understanding. Fall prevention education was provided and the patient/caregiver indicated understanding., Patient/family have participated as able in goal setting and plan of care. , and Patient/family agree to work toward stated goals and plan of care.     Thank you for this referral.  Niranjan Chicas, PT, DPT   Time Calculation: 14 mins

## 2022-11-08 NOTE — PROCEDURES
PROCEDURE: ROUTINE INPATIENT EEG  NAME:   Angeline Espinosa  ACCOUNT NUMBER : [de-identified]  MRN:   [id number]  DATE OF SERVICE: 11/7/22    HISTORY/INDICATION: Pt is a 46yo female with acute AMS. EEG to assess for underlying epilepsy. MEDICATIONS:   Current Outpatient Medications   Medication Sig Dispense Refill    ALPRAZolam (XANAX) 0.5 mg tablet Take 1 Tablet by mouth nightly as needed for Sleep for up to 10 days. Max Daily Amount: 0.5 mg.   10 Tablet 0    ergocalciferol (ERGOCALCIFEROL) 50,000 unit capsule Take 50,000 Units by mouth.        eszopiclone (LUNESTA) 2 mg tablet Take  by mouth nightly. (Patient not taking: Reported on 11/6/2022)      traMADol-acetaminophen (ULTRACET) 37.5-325 mg per tablet Take  by mouth every four (4) hours as needed. (Patient not taking: Reported on 11/6/2022)         CONDITIONS OF RECORDING: This is a routine 21-channel EEG recording performed in accordance with the international 10-20 system with one channel devoted to limited EKG. This study was done during a state of wakefulness. Photic stimulation and hyperventilation were performed as activating procedures. DESCRIPTION:   Upon maximal arousal the posterior dominant rhythm has a frequency of 10Hz with an amplitude of 40uV. This activity is symmetric over the bilateral posterior derivations and attenuates with eye opening. Photic stimulation and hyperventilation do not significantly alter the tracing. No sleep is seen. There are no focal abnormalities, epileptiform discharges, or electrographic seizures seen. INTERPRETATION: Normal awake EEG    CLINICAL CORRELATION: A normal EEG does not definitively exclude a diagnosis of epilepsy if clinical suspicion is high consider more prolonged monitoring.      Laurel Martin MD

## 2022-11-09 LAB
ARSENIC BLD-MCNC: <1 UG/L (ref 0–9)
HISPANIC, LDP2T: NORMAL
LEAD BLD-MCNC: 1 UG/DL (ref 0–3.4)
MERCURY BLD-MCNC: <1 UG/L (ref 0–14.9)
RACE, 017371: NORMAL
SPECIMEN SOURCE: NORMAL
TEST PURPOSE, LDP4T: NORMAL

## 2022-11-09 NOTE — PROGRESS NOTES
Physician Progress Note      PATIENTMathilda Room  CSN #:                  [id number]  :                       1970  ADMIT DATE:       2022 2:04 PM  [address] DATE:        2022 8:00 PM  RESPONDING  PROVIDER #:        Evan Crump MD          QUERY TEXT:    Pt admitted with AMS. Pt noted to have Bipolar disorder and sleep deprivation . If possible, please document in the progress notes and discharge summary if you are evaluating and / or treating any of the following: The medical record reflects the following:  Risk Factors: AMS    Clinical Indicators:  DISCHARGE DIAGNOSES: Altered mental status due to sleep deprivation  PAST PSYCHIATRIC HISTORY:Patient reports a hx of bipolar disorder    Treatment:  neuro and psych consult  LORazepam (ATIVAN) tablet 0.5 mg  D/C on ALPRAZolam (XANAX) 0.5 mg tablet    Thank you,  Mainor Maldonado RN [facility]  Clinical Documentation  [phone] or via [address] provided:  -- AMS due to manic phase of Bipolar disorder POA  -- AMS due to sleep deprivation POA  -- Other - I will add my own diagnosis  -- Disagree - Not applicable / Not valid  -- Disagree - Clinically unable to determine / Unknown  -- Refer to Clinical Documentation Reviewer    PROVIDER RESPONSE TEXT:    AMS due to sleep deprivation POA.     Query created by: Ayn Charlton on 2022 9:45 AM      Electronically signed by:  Evan Crump MD 2022 11:30 AM

## 2023-03-14 NOTE — PROGRESS NOTES
PHYSICAL THERAPY - DAILY TREATMENT NOTE Patient Name: Renato Kendall        Date: 2018 : 1970   yes Patient  Verified Visit #:   1     Insurance: Payor: /   
 
In time: 11:02 Out time: 11:50 Total Treatment Time: 48 Medicare/[facility] Time Tracking (below) Total Timed Codes (min):  38 1:1 Treatment Time:  45 TREATMENT AREA =  Right knee pain [M25.561] Right ankle pain [M25.571] SUBJECTIVE Pain Level (on 0 to 10 scale):  3-4  / 10 Medication Changes/New allergies or changes in medical history, any new surgeries or procedures?    no  If yes, update Summary List  
Subjective Functional Status/Changes:  []  No changes reported See POC OBJECTIVE Modalities Rationale:     decrease inflammation and decrease pain to improve patient's ability to tolerate standing/squatting 
 min [] Estim, type/location:   
                                 []  att     []  unatt     []  w/US     []  w/ice    []  w/heat 
 min []  Mechanical Traction: type/lbs   
               []  pro   []  sup   []  int   []  cont    []  before manual    []  after manual  
 min []  Ultrasound, settings/location:    
 min []  Iontophoresis w/ dexamethasone, location:   
                                           []  take home patch       []  in clinic  
10 min [x]  Ice     []  Heat    location/position: R knee, supine with LE wedge  
 min []  Vasopneumatic Device, press/temp:   
 min []  Other:   
[] Skin assessment post-treatment (if applicable):   
[]  intact    []  redness- no adverse reaction    
[]redness  adverse reaction:     
15 min Therapeutic Exercise:  [x]  See flow sheet Rationale:      increase ROM and increase strength to improve the patients ability to tolerate standing/walking activities. 8 min Manual Therapy: Meyer taping for correction of medial glide and lateral tilt R knee. Pt ed on indications, precautions.   Pt ed on tape Patient calling with questions/issues in regards to the following medication/s:   patient would like a call back regarding adjusting her Mounjaro medication       If patient would require a prescription, please us the following Pharmacy:   [id number] - [place], [place] - [address] AT [address]  [address] [place] [address]  Phone: [phone] Fax: [phone]           Please call  Patient at the following number:  Mobile [phone]    and states that it is okay to leave a detailed message.    Patient was informed that they would receive a phone call back within 24-48 hours unless this request is STAT.   removal if adverse signs/sx's and to remove at 5 days to allow skin to breathe. Rationale:      decrease pain, increase tissue extensibility and correct positional alignment to improve patient's ability to tolerate squat/stairs 
 
 min Patient Education:  yes  Reviewed HEP []  Progressed/Changed HEP based on: Other Objective/Functional Measures: 
 
See POC Post Treatment Pain Level (on 0 to 10) scale:   3  / 10 ASSESSMENT Assessment/Changes in Function:  
 
See POC []  See Progress Note/Recertification Patient will continue to benefit from skilled PT services to modify and progress therapeutic interventions, address functional mobility deficits, address ROM deficits, address strength deficits, analyze and address soft tissue restrictions, analyze and cue movement patterns and analyze and modify body mechanics/ergonomics to attain remaining goals. Progress toward goals / Updated goals: 
See POC PLAN 
[]  Upgrade activities as tolerated yes Continue plan of care  
[]  Discharge due to :   
[]  Other:   
 
Therapist: Kandy Tan. Yaya PT Date: 12/11/2018 Time: 8:06 AM  
 
Future Appointments Date Time Provider Joseph Paez 12/13/2018 10:30 AM Riddhi Quintana, PT [facility]  
12/17/2018  4:00 PM Thaddeus Mckeon [facility]  
12/19/2018  4:00 PM Carolynn Naranjo [facility]  
1/2/2019  4:30 PM Riddhi Quintana, PT [facility]  
1/3/2019 10:00 AM [facility] PT [address] [facility]  
1/8/2019 10:00 AM [facility] PT [address] [facility]  
1/10/2019 10:00 AM [facility] PT [address]